# Patient Record
Sex: MALE | Race: BLACK OR AFRICAN AMERICAN | NOT HISPANIC OR LATINO | Employment: UNEMPLOYED | URBAN - METROPOLITAN AREA
[De-identification: names, ages, dates, MRNs, and addresses within clinical notes are randomized per-mention and may not be internally consistent; named-entity substitution may affect disease eponyms.]

---

## 2023-03-20 LAB — SYPHILIS TOTAL AB: NONREACTIVE

## 2023-03-21 LAB
CHLAMYDIA TRACH., AMPLIFIED: NEGATIVE
N. GONORRHEA, AMPLIFIED: NEGATIVE

## 2023-06-09 LAB
COMPLETE PATHOLOGY REPORT: NORMAL
CONVERTED CLINICAL DIAGNOSIS-HISTORY: NORMAL
CONVERTED DIAGNOSIS COMMENT: NORMAL
CONVERTED FINAL DIAGNOSIS: NORMAL
CONVERTED FINAL REPORT PDF LINK TO COPY AND PASTE: NORMAL
CONVERTED SPECIMEN DESCRIPTION: NORMAL

## 2023-07-19 ENCOUNTER — HOSPITAL ENCOUNTER (OUTPATIENT)
Dept: DATA CONVERSION | Facility: HOSPITAL | Age: 33
End: 2023-07-19
Attending: SURGERY | Admitting: SURGERY
Payer: COMMERCIAL

## 2023-07-19 DIAGNOSIS — K62.82 DYSPLASIA OF ANUS: ICD-10-CM

## 2023-07-27 LAB
COMPLETE PATHOLOGY REPORT: NORMAL
CONVERTED CLINICAL DIAGNOSIS-HISTORY: NORMAL
CONVERTED FINAL DIAGNOSIS: NORMAL
CONVERTED FINAL REPORT PDF LINK TO COPY AND PASTE: NORMAL
CONVERTED GROSS DESCRIPTION: NORMAL

## 2023-08-24 LAB
ALANINE AMINOTRANSFERASE (SGPT) (U/L) IN SER/PLAS: 11 U/L (ref 10–52)
ALBUMIN (G/DL) IN SER/PLAS: 4.6 G/DL (ref 3.4–5)
ALKALINE PHOSPHATASE (U/L) IN SER/PLAS: 53 U/L (ref 33–120)
ANION GAP IN SER/PLAS: 14 MMOL/L (ref 10–20)
ASPARTATE AMINOTRANSFERASE (SGOT) (U/L) IN SER/PLAS: 24 U/L (ref 9–39)
BASOPHILS (10*3/UL) IN BLOOD BY AUTOMATED COUNT: 0.04 X10E9/L (ref 0–0.1)
BASOPHILS/100 LEUKOCYTES IN BLOOD BY AUTOMATED COUNT: 0.4 % (ref 0–2)
BILIRUBIN TOTAL (MG/DL) IN SER/PLAS: 0.7 MG/DL (ref 0–1.2)
CALCIDIOL (25 OH VITAMIN D3) (NG/ML) IN SER/PLAS: 48 NG/ML
CALCIUM (MG/DL) IN SER/PLAS: 9.3 MG/DL (ref 8.6–10.6)
CARBON DIOXIDE, TOTAL (MMOL/L) IN SER/PLAS: 26 MMOL/L (ref 21–32)
CD3+CD4+ ABSOLUTE: 1.16 X10E9/L (ref 0.35–2.74)
CD3+CD8+ ABSOLUTE: 0.78 X10E9/L (ref 0.08–1.49)
CD4/CD8 RATIO: 1.48 (ref 1–3.5)
CD45%: 100 %
CHLAMYDIA TRACH., AMPLIFIED: NEGATIVE
CHLORIDE (MMOL/L) IN SER/PLAS: 107 MMOL/L (ref 98–107)
CHOLESTEROL (MG/DL) IN SER/PLAS: 121 MG/DL (ref 0–199)
CHOLESTEROL IN HDL (MG/DL) IN SER/PLAS: 49.3 MG/DL
CHOLESTEROL/HDL RATIO: 2.5
CP CD3+CD4+%: 46 % (ref 29–57)
CP CD3+CD8+%: 31 % (ref 7–31)
CREATININE (MG/DL) IN SER/PLAS: 1.13 MG/DL (ref 0.5–1.3)
EOSINOPHILS (10*3/UL) IN BLOOD BY AUTOMATED COUNT: 0.12 X10E9/L (ref 0–0.7)
EOSINOPHILS/100 LEUKOCYTES IN BLOOD BY AUTOMATED COUNT: 1.2 % (ref 0–6)
ERYTHROCYTE DISTRIBUTION WIDTH (RATIO) BY AUTOMATED COUNT: 13.4 % (ref 11.5–14.5)
ERYTHROCYTE MEAN CORPUSCULAR HEMOGLOBIN CONCENTRATION (G/DL) BY AUTOMATED: 35 G/DL (ref 32–36)
ERYTHROCYTE MEAN CORPUSCULAR VOLUME (FL) BY AUTOMATED COUNT: 92 FL (ref 80–100)
ERYTHROCYTES (10*6/UL) IN BLOOD BY AUTOMATED COUNT: 4.41 X10E12/L (ref 4.5–5.9)
FMETH: NORMAL
FSIT1: NORMAL
GFR MALE: 89 ML/MIN/1.73M2
GLUCOSE (MG/DL) IN SER/PLAS: 58 MG/DL (ref 74–99)
HEMATOCRIT (%) IN BLOOD BY AUTOMATED COUNT: 40.6 % (ref 41–52)
HEMOGLOBIN (G/DL) IN BLOOD: 14.2 G/DL (ref 13.5–17.5)
IMMATURE GRANULOCYTES/100 LEUKOCYTES IN BLOOD BY AUTOMATED COUNT: 0.3 % (ref 0–0.9)
LDL: 61 MG/DL (ref 0–99)
LEUKOCYTES (10*3/UL) IN BLOOD BY AUTOMATED COUNT: 10 X10E9/L (ref 4.4–11.3)
LYMPHOCYTES (10*3/UL) IN BLOOD BY AUTOMATED COUNT: 2.53 X10E9/L (ref 1.2–4.8)
LYMPHOCYTES/100 LEUKOCYTES IN BLOOD BY AUTOMATED COUNT: 25.2 % (ref 13–44)
MONOCYTES (10*3/UL) IN BLOOD BY AUTOMATED COUNT: 0.85 X10E9/L (ref 0.1–1)
MONOCYTES/100 LEUKOCYTES IN BLOOD BY AUTOMATED COUNT: 8.5 % (ref 2–10)
N. GONORRHEA, AMPLIFIED: NEGATIVE
NEUTROPHILS (10*3/UL) IN BLOOD BY AUTOMATED COUNT: 6.47 X10E9/L (ref 1.2–7.7)
NEUTROPHILS/100 LEUKOCYTES IN BLOOD BY AUTOMATED COUNT: 64.4 % (ref 40–80)
NRBC (PER 100 WBCS) BY AUTOMATED COUNT: 0 /100 WBC (ref 0–0)
PLATELETS (10*3/UL) IN BLOOD AUTOMATED COUNT: 119 X10E9/L (ref 150–450)
POTASSIUM (MMOL/L) IN SER/PLAS: 4.5 MMOL/L (ref 3.5–5.3)
PROTEIN TOTAL: 7.3 G/DL (ref 6.4–8.2)
RBC MORPHOLOGY IN BLOOD: NORMAL
SODIUM (MMOL/L) IN SER/PLAS: 142 MMOL/L (ref 136–145)
SYPHILIS TOTAL AB: NONREACTIVE
TRIGLYCERIDE (MG/DL) IN SER/PLAS: 54 MG/DL (ref 0–149)
UREA NITROGEN (MG/DL) IN SER/PLAS: 14 MG/DL (ref 6–23)
VLDL: 11 MG/DL (ref 0–40)

## 2023-08-25 LAB
HIV-1 RNA PCR VIRAL LOAD LOG: NORMAL LOG10 CPY/ML
HIV-1 RNA VIRAL LOAD: NOT DETECTED COPIES/ML

## 2023-09-29 VITALS
HEIGHT: 71 IN | SYSTOLIC BLOOD PRESSURE: 119 MMHG | BODY MASS INDEX: 20.22 KG/M2 | DIASTOLIC BLOOD PRESSURE: 82 MMHG | WEIGHT: 144.4 LBS | HEART RATE: 55 BPM | TEMPERATURE: 97.2 F | RESPIRATION RATE: 15 BRPM

## 2023-09-30 NOTE — H&P
History & Physical Reviewed:   I have reviewed the History and Physical dated:  07-Jul-2023   History and Physical reviewed and relevant findings noted. Patient examined to review pertinent physical  findings.: No significant changes   Home Medications Reviewed: no changes noted   Allergies Reviewed: no changes noted       ERAS (Enhanced Recovery After Surgery):  ·  ERAS Patient: no     Consent:   COVID-19 Consent:  ·  COVID-19 Risk Consent Surgeon has reviewed key risks related to the risk of tawanda COVID-19 and if they contract COVID-19 what the risks are.     Attestation:   Note Completion:  I am a:  Resident/Fellow   Attending Attestation I saw and evaluated the patient.  I personally obtained the key and critical portions of the history and physical exam or was physically present for key and  critical portions performed by the resident/fellow. I reviewed the resident/fellow?s documentation and discussed the patient with the resident/fellow.  I agree with the resident/fellow?s medical decision making as documented in the note.     I personally evaluated the patient on 19-Jul-2023         Electronic Signatures:  Isidra Brennan (Fellow))  (Signed 19-Jul-2023 08:03)   Authored: History & Physical Reviewed, ERAS, Consent,  Note Completion  Shameka Still)  (Signed 19-Jul-2023 08:18)   Authored: Note Completion   Co-Signer: History & Physical Reviewed, ERAS, Consent, Note Completion      Last Updated: 19-Jul-2023 08:18 by Shameka Still)

## 2023-10-02 NOTE — OP NOTE
PROCEDURE DETAILS    Preoperative Diagnosis:  anal dysplasia  Postoperative Diagnosis:  same  Surgeon: Shameka Still  Resident/Fellow/Other Assistant: Isidra Brennan    Procedure:  1.  high resolution anoscopy with biopsy    Anesthesia: Michelle Velez  Estimated Blood Loss: 0  Findings: anterior anal canal with area of possible dysplasia        Operative Report:   Procedure:   Informed consent was obtained including discussion of risks, benefits, and alternatives. The patient was brought to the operating room and placed supine. Sequential compression devices were placed. Huddle was completed in accordance with hospital procedures.  Anesthesia was induced and LMA was placed. The patient was placed in lithotomy with all pressure points padded. The area was prepped and draped in the usual fashion. Time out was completed. Digital rectal exam was performed. Pudendal nerve block was performed  with 1/4% marcaine with epinephrine. Intersphincteric block  was also performed. A total of 30cc of local anesthesia was utilized.    Externally, the perineum was normal. [place]-Garrett anoscope was introduced. The anal canal was examined sequentially. The canal itself was divided into octants and in each octant, the distal rectum, dentate, anal canal, and anal verge was examined.  Magnification, acetic acid, and Lugol's were utilized to improve visualization. Areas of abnormality were biopsied and destroyed. There was as single flat area of possible dysplasia anteriorly.     The area was checked for hemostasis and found to be adequate. All visible lesions were addressed.     The patient was then returned to supine. Anesthesia was weaned. The patient was transferred to PACU awake and in stable conditions. Counts were  reported correct at the end of the procedure. I was present and scrubbed throughout.                           Electronic Signatures:  Shameka Still)  (Signed 19-Jul-2023  08:41)   Authored: Post-Operative Note, Chart Review, Note Completion      Last Updated: 19-Jul-2023 08:41 by Shameka Still)

## 2023-12-07 ENCOUNTER — APPOINTMENT (OUTPATIENT)
Dept: UROLOGY | Facility: CLINIC | Age: 33
End: 2023-12-07

## 2023-12-28 ENCOUNTER — NUTRITION (OUTPATIENT)
Dept: IMMUNOLOGY | Facility: CLINIC | Age: 33
End: 2023-12-28

## 2023-12-28 ENCOUNTER — OFFICE VISIT (OUTPATIENT)
Dept: IMMUNOLOGY | Facility: CLINIC | Age: 33
End: 2023-12-28
Payer: COMMERCIAL

## 2023-12-28 VITALS
HEIGHT: 71 IN | HEART RATE: 98 BPM | DIASTOLIC BLOOD PRESSURE: 81 MMHG | TEMPERATURE: 97.5 F | BODY MASS INDEX: 19.82 KG/M2 | RESPIRATION RATE: 18 BRPM | WEIGHT: 141.6 LBS | OXYGEN SATURATION: 98 % | SYSTOLIC BLOOD PRESSURE: 116 MMHG

## 2023-12-28 DIAGNOSIS — B20 HIV INFECTION, UNSPECIFIED SYMPTOM STATUS (MULTI): ICD-10-CM

## 2023-12-28 DIAGNOSIS — R85.610 PAP SMEAR OF ANUS WITH ASCUS: Primary | ICD-10-CM

## 2023-12-28 DIAGNOSIS — E55.9 VITAMIN D DEFICIENCY: ICD-10-CM

## 2023-12-28 DIAGNOSIS — Z23 FLU VACCINE NEED: ICD-10-CM

## 2023-12-28 DIAGNOSIS — B20 HIV INFECTION, UNSPECIFIED SYMPTOM STATUS (MULTI): Primary | ICD-10-CM

## 2023-12-28 PROBLEM — Z21 ASYMPTOMATIC HIV INFECTION, WITH NO HISTORY OF HIV-RELATED ILLNESS (MULTI): Status: ACTIVE | Noted: 2023-12-28

## 2023-12-28 PROBLEM — E66.811 CLASS 1 OBESITY DUE TO EXCESS CALORIES WITHOUT SERIOUS COMORBIDITY IN ADULT: Status: RESOLVED | Noted: 2023-12-28 | Resolved: 2023-12-28

## 2023-12-28 PROBLEM — I10 PRIMARY HYPERTENSION: Status: RESOLVED | Noted: 2023-12-28 | Resolved: 2023-12-28

## 2023-12-28 PROBLEM — E66.09 CLASS 1 OBESITY DUE TO EXCESS CALORIES WITHOUT SERIOUS COMORBIDITY IN ADULT: Status: RESOLVED | Noted: 2023-12-28 | Resolved: 2023-12-28

## 2023-12-28 PROBLEM — E66.811 CLASS 1 OBESITY DUE TO EXCESS CALORIES WITHOUT SERIOUS COMORBIDITY IN ADULT: Status: ACTIVE | Noted: 2023-12-28

## 2023-12-28 PROBLEM — E66.09 CLASS 1 OBESITY DUE TO EXCESS CALORIES WITHOUT SERIOUS COMORBIDITY IN ADULT: Status: ACTIVE | Noted: 2023-12-28

## 2023-12-28 PROBLEM — I10 PRIMARY HYPERTENSION: Status: ACTIVE | Noted: 2023-12-28

## 2023-12-28 LAB
25(OH)D3 SERPL-MCNC: 30 NG/ML (ref 30–100)
ALBUMIN SERPL BCP-MCNC: 4.5 G/DL (ref 3.4–5)
ALP SERPL-CCNC: 57 U/L (ref 33–120)
ALT SERPL W P-5'-P-CCNC: 22 U/L (ref 10–52)
ANION GAP SERPL CALC-SCNC: 13 MMOL/L (ref 10–20)
AST SERPL W P-5'-P-CCNC: 27 U/L (ref 9–39)
BASOPHILS # BLD AUTO: 0.05 X10*3/UL (ref 0–0.1)
BASOPHILS NFR BLD AUTO: 0.6 %
BILIRUB SERPL-MCNC: 0.8 MG/DL (ref 0–1.2)
BUN SERPL-MCNC: 13 MG/DL (ref 6–23)
CALCIUM SERPL-MCNC: 9.5 MG/DL (ref 8.6–10.6)
CHLORIDE SERPL-SCNC: 104 MMOL/L (ref 98–107)
CO2 SERPL-SCNC: 28 MMOL/L (ref 21–32)
CREAT SERPL-MCNC: 1.2 MG/DL (ref 0.5–1.3)
EOSINOPHIL # BLD AUTO: 0.21 X10*3/UL (ref 0–0.7)
EOSINOPHIL NFR BLD AUTO: 2.5 %
ERYTHROCYTE [DISTWIDTH] IN BLOOD BY AUTOMATED COUNT: 12.8 % (ref 11.5–14.5)
GFR SERPL CREATININE-BSD FRML MDRD: 83 ML/MIN/1.73M*2
GLUCOSE SERPL-MCNC: 79 MG/DL (ref 74–99)
HCT VFR BLD AUTO: 42.9 % (ref 41–52)
HGB BLD-MCNC: 15.1 G/DL (ref 13.5–17.5)
IMM GRANULOCYTES # BLD AUTO: 0.02 X10*3/UL (ref 0–0.7)
IMM GRANULOCYTES NFR BLD AUTO: 0.2 % (ref 0–0.9)
LYMPHOCYTES # BLD AUTO: 3.25 X10*3/UL (ref 1.2–4.8)
LYMPHOCYTES NFR BLD AUTO: 38.9 %
MCH RBC QN AUTO: 32.1 PG (ref 26–34)
MCHC RBC AUTO-ENTMCNC: 35.2 G/DL (ref 32–36)
MCV RBC AUTO: 91 FL (ref 80–100)
MONOCYTES # BLD AUTO: 0.57 X10*3/UL (ref 0.1–1)
MONOCYTES NFR BLD AUTO: 6.8 %
NEUTROPHILS # BLD AUTO: 4.25 X10*3/UL (ref 1.2–7.7)
NEUTROPHILS NFR BLD AUTO: 51 %
NRBC BLD-RTO: 0 /100 WBCS (ref 0–0)
PLATELET # BLD AUTO: 148 X10*3/UL (ref 150–450)
POTASSIUM SERPL-SCNC: 4 MMOL/L (ref 3.5–5.3)
PROT SERPL-MCNC: 7.4 G/DL (ref 6.4–8.2)
RBC # BLD AUTO: 4.7 X10*6/UL (ref 4.5–5.9)
SODIUM SERPL-SCNC: 141 MMOL/L (ref 136–145)
WBC # BLD AUTO: 8.4 X10*3/UL (ref 4.4–11.3)

## 2023-12-28 PROCEDURE — 90686 IIV4 VACC NO PRSV 0.5 ML IM: CPT | Performed by: INTERNAL MEDICINE

## 2023-12-28 PROCEDURE — 36415 COLL VENOUS BLD VENIPUNCTURE: CPT | Performed by: INTERNAL MEDICINE

## 2023-12-28 PROCEDURE — 87536 HIV-1 QUANT&REVRSE TRNSCRPJ: CPT | Performed by: INTERNAL MEDICINE

## 2023-12-28 PROCEDURE — 99214 OFFICE O/P EST MOD 30 MIN: CPT | Performed by: INTERNAL MEDICINE

## 2023-12-28 PROCEDURE — 82306 VITAMIN D 25 HYDROXY: CPT | Performed by: INTERNAL MEDICINE

## 2023-12-28 PROCEDURE — 99214 OFFICE O/P EST MOD 30 MIN: CPT | Mod: 25 | Performed by: INTERNAL MEDICINE

## 2023-12-28 PROCEDURE — 4274F FLU IMMUNO ADMIND RCVD: CPT | Performed by: INTERNAL MEDICINE

## 2023-12-28 PROCEDURE — 85025 COMPLETE CBC W/AUTO DIFF WBC: CPT | Performed by: INTERNAL MEDICINE

## 2023-12-28 PROCEDURE — 80053 COMPREHEN METABOLIC PANEL: CPT | Performed by: INTERNAL MEDICINE

## 2023-12-28 PROCEDURE — 88185 FLOWCYTOMETRY/TC ADD-ON: CPT | Mod: TC | Performed by: INTERNAL MEDICINE

## 2023-12-28 RX ORDER — BICTEGRAVIR SODIUM, EMTRICITABINE, AND TENOFOVIR ALAFENAMIDE FUMARATE 50; 200; 25 MG/1; MG/1; MG/1
1 TABLET ORAL DAILY
COMMUNITY
End: 2023-12-28 | Stop reason: SDUPTHER

## 2023-12-28 RX ORDER — BICTEGRAVIR SODIUM, EMTRICITABINE, AND TENOFOVIR ALAFENAMIDE FUMARATE 50; 200; 25 MG/1; MG/1; MG/1
1 TABLET ORAL DAILY
Qty: 30 TABLET | Refills: 5 | Status: SHIPPED | OUTPATIENT
Start: 2023-12-28 | End: 2024-05-10 | Stop reason: SDUPTHER

## 2023-12-28 RX ORDER — LORATADINE 10 MG/1
10 TABLET ORAL
COMMUNITY

## 2023-12-28 RX ORDER — CHOLECALCIFEROL (VITAMIN D3) 50 MCG
1 TABLET ORAL DAILY
COMMUNITY

## 2023-12-28 ASSESSMENT — ENCOUNTER SYMPTOMS
DIAPHORESIS: 0
FATIGUE: 0
ARTHRALGIAS: 0
HEMATOLOGIC/LYMPHATIC NEGATIVE: 1
FLANK PAIN: 0
DIZZINESS: 0
CHILLS: 0
NECK STIFFNESS: 0
COUGH: 0
EYE PAIN: 0
EYE DISCHARGE: 0
FEVER: 0
APPETITE CHANGE: 0
ABDOMINAL DISTENTION: 0
HYPERACTIVE: 0
NUMBNESS: 0
DIARRHEA: 0
CARDIOVASCULAR NEGATIVE: 1
ALLERGIC/IMMUNOLOGIC NEGATIVE: 1
EYE REDNESS: 0
LIGHT-HEADEDNESS: 0
ADENOPATHY: 0
RECTAL PAIN: 0
BRUISES/BLEEDS EASILY: 0
DYSURIA: 0
EYE ITCHING: 0
SLEEP DISTURBANCE: 0
MYALGIAS: 0
SINUS PRESSURE: 0
BACK PAIN: 0
TREMORS: 0
CONSTITUTIONAL NEGATIVE: 1
NEUROLOGICAL NEGATIVE: 1
COLOR CHANGE: 0
PSYCHIATRIC NEGATIVE: 1
CONFUSION: 0
HEMATURIA: 0
HEADACHES: 0
PHOTOPHOBIA: 0
JOINT SWELLING: 0
SINUS PAIN: 0
MUSCULOSKELETAL NEGATIVE: 1
NAUSEA: 0
DYSPHORIC MOOD: 0
NECK PAIN: 0
EYES NEGATIVE: 1
DECREASED CONCENTRATION: 0
CONSTIPATION: 0
ENDOCRINE NEGATIVE: 1
WHEEZING: 0
WOUND: 0
FREQUENCY: 0
ABDOMINAL PAIN: 0
SEIZURES: 0
BLOOD IN STOOL: 0
SHORTNESS OF BREATH: 0
RHINORRHEA: 0
HALLUCINATIONS: 0
SORE THROAT: 0
ANAL BLEEDING: 0
NERVOUS/ANXIOUS: 0
VOMITING: 0
DIFFICULTY URINATING: 0
SPEECH DIFFICULTY: 0
GASTROINTESTINAL NEGATIVE: 1

## 2023-12-28 ASSESSMENT — PAIN SCALES - GENERAL: PAINLEVEL: 0-NO PAIN

## 2023-12-28 NOTE — PROGRESS NOTES
HIV Clinic Visit:   Odilia Villegas is a 33 y.o. male was last seen in [facility] on today.    Missed antiretroviral doses in last 72 hours? No    Sexually active? Yes Partner/s aware of diagnosis? Yes    Condom use? Yes Partner on PrEP? No    Tobacco use: Yes. He smokes cigars.    SUBJECTIVE: Mr Villegas states that he is doing well. He is here with his partner who is also HIV positive and on treatment. Mr Villegas denies any complains. He has not missed any doses of his medications. He works as an . He agreed to receive an influenza vaccine.         Review of Systems   Constitutional: Negative.  Negative for appetite change, chills, diaphoresis, fatigue and fever.   HENT: Negative.  Negative for congestion, dental problem, ear discharge, ear pain, hearing loss, mouth sores, nosebleeds, rhinorrhea, sinus pressure, sinus pain, sneezing, sore throat and tinnitus.    Eyes: Negative.  Negative for photophobia, pain, discharge, redness, itching and visual disturbance.   Respiratory:  Negative for cough, shortness of breath and wheezing.    Cardiovascular: Negative.  Negative for chest pain.   Gastrointestinal: Negative.  Negative for abdominal distention, abdominal pain, anal bleeding, blood in stool, constipation, diarrhea, nausea, rectal pain and vomiting.   Endocrine: Negative.    Genitourinary: Negative.  Negative for difficulty urinating, dysuria, enuresis, flank pain, frequency, genital sores, hematuria and urgency.   Musculoskeletal: Negative.  Negative for arthralgias, back pain, gait problem, joint swelling, myalgias, neck pain and neck stiffness.   Skin: Negative.  Negative for color change, pallor, rash and wound.   Allergic/Immunologic: Negative.    Neurological: Negative.  Negative for dizziness, tremors, seizures, syncope, speech difficulty, light-headedness, numbness and headaches.   Hematological: Negative.  Negative for adenopathy. Does not bruise/bleed easily.   Psychiatric/Behavioral: Negative.   "Negative for behavioral problems, confusion, decreased concentration, dysphoric mood, hallucinations, self-injury and sleep disturbance. The patient is not nervous/anxious and is not hyperactive.          Objective   Visit Vitals  /81 (BP Location: Left arm, Patient Position: Sitting, BP Cuff Size: Adult)   Pulse 98   Temp 36.4 °C (97.5 °F) (Temporal)   Resp 18   Ht 1.803 m (5' 11\")   Wt 64.2 kg (141 lb 9.6 oz)   SpO2 98%   BMI 19.75 kg/m²   Smoking Status Every Day   BSA 1.79 m²        Physical Exam  Constitutional:       Appearance: Normal appearance.   HENT:      Head: Normocephalic and atraumatic.      Right Ear: Tympanic membrane normal.      Left Ear: Tympanic membrane normal.      Nose: Nose normal.      Mouth/Throat:      Mouth: Mucous membranes are moist.      Tongue: No lesions.      Palate: No mass.      Pharynx: Oropharynx is clear. No pharyngeal swelling, oropharyngeal exudate, posterior oropharyngeal erythema or uvula swelling.      Tonsils: No tonsillar exudate.   Eyes:      General: Lids are normal.      Extraocular Movements: Extraocular movements intact.      Conjunctiva/sclera: Conjunctivae normal.      Pupils: Pupils are equal, round, and reactive to light.   Neck:      Thyroid: No thyroid mass or thyroid tenderness.      Vascular: Normal carotid pulses. No carotid bruit or JVD.      Trachea: Trachea normal.   Cardiovascular:      Rate and Rhythm: Normal rate and regular rhythm.      Pulses: Normal pulses.      Heart sounds: Normal heart sounds.   Pulmonary:      Effort: Pulmonary effort is normal.      Breath sounds: Normal breath sounds.   Abdominal:      General: Abdomen is flat. Bowel sounds are normal.      Palpations: Abdomen is soft. There is no hepatomegaly, splenomegaly or mass.      Tenderness: There is no abdominal tenderness.   Musculoskeletal:         General: No swelling, tenderness, deformity or signs of injury. Normal range of motion.      Cervical back: Full passive range " "of motion without pain, normal range of motion and neck supple.      Right lower leg: No edema.      Left lower leg: No edema.   Lymphadenopathy:      Cervical: No cervical adenopathy.   Skin:     General: Skin is warm and dry.   Neurological:      General: No focal deficit present.      Mental Status: He is alert and oriented to person, place, and time.   Psychiatric:         Mood and Affect: Mood normal.         CURRENT MEDICATIONS    Current Outpatient Medications:     Biktarvy -25 mg tablet, Take 1 tablet by mouth once daily., Disp: , Rfl:     cholecalciferol (Vitamin D-3) 50 MCG (2000 UT) tablet, Take 1 tablet (2,000 Units) by mouth once daily., Disp: , Rfl:     loratadine (Claritin) 10 mg tablet, Take 1 tablet (10 mg) by mouth once daily., Disp: , Rfl:        Relevant Results  Labs  Lab Results   Component Value Date    LZL7RLTAY NOT DETECTED 08/24/2023    VX7QOT5GCWN 0.784 08/24/2023    VITD25 48 08/24/2023      Lab Results   Component Value Date    WBC 10.0 08/24/2023    HGB 14.2 08/24/2023    HCT 40.6 (L) 08/24/2023    MCV 92 08/24/2023     (L) 08/24/2023      Lab Results   Component Value Date    GLUCOSE 58 (L) 08/24/2023    CALCIUM 9.3 08/24/2023     08/24/2023    K 4.5 08/24/2023    CO2 26 08/24/2023     08/24/2023    BUN 14 08/24/2023    CREATININE 1.13 08/24/2023      Lab Results   Component Value Date    CHOL 121 08/24/2023    CHOL 111 11/04/2021    CHOL 118 10/08/2020     Lab Results   Component Value Date    HDL 49.3 08/24/2023    HDL 47.0 11/04/2021    HDL 53.1 10/08/2020     No results found for: \"LDLCALC\"  Lab Results   Component Value Date    TRIG 54 08/24/2023    TRIG 54 11/04/2021    TRIG 26 (LL) 10/08/2020     No components found for: \"CHOLHDL\"       Assessment/Plan   Problem List Items Addressed This Visit       RESOLVED: Primary hypertension - Primary    Vitamin D deficiency    Overview     Mr Villegas is an chronic Vitamin D supplementation. Vit D level has been " adequate.         Relevant Orders    Vitamin D 25-Hydroxy,Total (for eval of Vitamin D levels)     Other Visit Diagnoses       Flu vaccine need        Relevant Orders    Flu vaccine (IIV4) age 6 months and greater, preservative free (Completed)    HIV infection, unspecified symptom status (CMS/HCC)        Relevant Orders    CBC and Auto Differential    CD4/8 Panel    Comprehensive Metabolic Panel    HIV RNA, quantitative, PCR               Conner Montemayor MD

## 2023-12-28 NOTE — PROGRESS NOTES
"Nutrition Assessment     Reason for Visit:  Odilia Villegas is a 33 y.o. male who was seen today for follow-up.     Anthropometrics:  Height: 5'11\"  Weight: 64.2 kg   BMI: 19.8      Food And Nutrient Intake:  Food and Nutrient History  Food and Nutrient History: Pt is progressing well at this time. When asked about participating in the [employer] market, he explained that he went once but no longer has food insecurity as he is in a better position at this time. He reported that he eats a variety of poultry, fish, ocassionally beef, misc. fruit and vegetables, grains, nuts and seeds. He had questions related to MVI, however MD discouraged pt from taking MVI containing minerals 2/2 potential interaction with prescribed ART (per pt). Considering pt's report of comprehensive intake and no known hx of micronutrient deficiency or provider recommendation that he take an MVI, this RD recommended that he forgo MVI at this time.     Nutrition Diagnosis      Nutrition Diagnosis  Diagnosis Status (1): Resolved  Nutrition Diagnosis 1: Limited access to food  Related to (1): socioeconomic limitations  As Evidenced by (1): pt interview, pt responding \"Yes\" to one or both food insecurity questions on [facility] intake questionnaire  Additional Assessment Information (1): Pt reported today that he no longer has food insecurity.    Nutrition Interventions/Recommendations   Food and Nutrition Delivery  Goals: Pt was encouraged to contact this RD or the LEI should he experience food insecurity again in the future.    Nutrition Monitoring and Evaluation   Food/Nutrient Related History Monitoring  Additional Plan: This RD will follow-up with pt at next visit to ensure stable food access.    Follow-up  Progress will be reassessed at next office visit. The estimated number of remaining follow-ups at this time: 1+ at a frequency of ~Q6 months. The total number and frequency of remaining follow-ups may change depending on patient's progress. "     Time spent with patient: 15 minutes of which greater than 50 percent was spent counseling and or coordinating care.

## 2023-12-28 NOTE — LETTER
12/28/23    Anabelle Ho MD  [id number] [facility] [address]      Dear Dr. Anabelle Ho MD,    I am writing to confirm that your patient, Odilia Villegas, received care in my office on 12/28/23. I have enclosed a summary of the care provided to Odilia for your reference.    Please contact me with any questions you may have regarding the visit.    Sincerely,         Conner Montemayor MD  [address]  [facility] [address]  [phone]    CC: No Recipients

## 2023-12-29 LAB
CD3+CD4+ CELLS # BLD: 1.62 X10E9/L
CD3+CD4+ CELLS # BLD: 1625 /MM3
CD3+CD4+ CELLS NFR BLD: 50 %
CD3+CD4+ CELLS/CD3+CD8+ CLL BLD: 1.47 %
CD3+CD8+ CELLS # BLD: 1.1 X10E9/L
CD3+CD8+ CELLS NFR BLD: 34 %
HIV1 RNA # PLAS NAA DL=20: 22 COPIES/ML
HIV1 RNA # PLAS NAA DL=20: DETECTED {COPIES}/ML
HIV1 RNA SPEC NAA+PROBE-LOG#: 1.34 LOG10 CPY/ML
LYMPHOCYTES # SPEC AUTO: 3.25 X10*3/UL

## 2024-01-15 NOTE — PROGRESS NOTES
No chief complaint on file.      History Of Present Illness    Subjective   Odilia Villegas is here for follow up of anal dysplasia.  he  has not has not noticed any new lesions or changes.     HIV status: positive- CD4 WNL and VL 22 copies   Smoking status: current smoker    Previous High Resolution Anoscopies and Cytologies have shown:     6/2015 PAP: Negative  6/2016 PAP: ASCUS  10/2016 HRA: Condyloma with moderate dysplasia  6/2023 PAP: LSIL  7/2023 HRA: squamous mucosa with mild superficial neutrophilic infiltrate       Past Medical History  He  Past Medical History:   Diagnosis Date    Encounter for screening for respiratory tuberculosis 05/23/2019    Encounter for PPD test    Other conditions influencing health status     No significant past medical history       Surgical History  He  Past Surgical History:   Procedure Laterality Date    OTHER SURGICAL HISTORY  04/26/2017    Anal Surgery        Social History  He reports that he has been smoking cigars. He has never used smokeless tobacco. He reports current alcohol use. He reports current drug use. Drug: Marijuana.    Family History  No family history on file.     Allergies  Shellfish containing products    Home Medications  Prior to Admission medications    Medication Sig Start Date End Date Taking? Authorizing Provider   Biktarvy -25 mg tablet Take 1 tablet by mouth once daily. 12/28/23   Conner Montemayor MD   cholecalciferol (Vitamin D-3) 50 MCG (2000 UT) tablet Take 1 tablet (2,000 Units) by mouth once daily.    Historical Provider, MD   loratadine (Claritin) 10 mg tablet Take 1 tablet (10 mg) by mouth once daily.    Historical Provider, MD       Review of Systems     Physical Exam    Perineal/SAVANA:  Perineum: normal  SAVANA: normal  Tone: normal    Anoscopy    Date/Time: 1/18/2024 10:52 AM    Performed by: Shameka Still MD  Authorized by: Shameka Still MD    Consent:     Consent obtained:  Verbal    Consent given by:   Patient  Post-procedure details:     Procedure completion:  Tolerated well, no immediate complications  Comments:      Findings: no dysplasia      Last Recorded Vitals  There were no vitals taken for this visit.      Assessment and Plan  34 y.o. male here for follow up of anal dysplasia.     Normal exam. Follow up 6 months with cytology.     I emphasized the significant possibility of recurrence and the need for close follow-up. We discussed barrier protection when sexually active, non-smoking, and taking HIV medications.

## 2024-01-18 ENCOUNTER — OFFICE VISIT (OUTPATIENT)
Dept: SURGERY | Facility: CLINIC | Age: 34
End: 2024-01-18
Payer: COMMERCIAL

## 2024-01-18 VITALS
OXYGEN SATURATION: 97 % | HEART RATE: 94 BPM | WEIGHT: 146 LBS | TEMPERATURE: 97.8 F | DIASTOLIC BLOOD PRESSURE: 62 MMHG | BODY MASS INDEX: 20.44 KG/M2 | HEIGHT: 71 IN | SYSTOLIC BLOOD PRESSURE: 104 MMHG

## 2024-01-18 DIAGNOSIS — R85.610 PAP SMEAR OF ANUS WITH ASCUS: Primary | ICD-10-CM

## 2024-01-18 PROCEDURE — 99214 OFFICE O/P EST MOD 30 MIN: CPT | Performed by: SURGERY

## 2024-01-18 PROCEDURE — 46600 DIAGNOSTIC ANOSCOPY SPX: CPT | Performed by: SURGERY

## 2024-01-18 ASSESSMENT — PAIN SCALES - GENERAL: PAINLEVEL: 0-NO PAIN

## 2024-05-09 ENCOUNTER — TELEPHONE (OUTPATIENT)
Dept: IMMUNOLOGY | Facility: CLINIC | Age: 34
End: 2024-05-09

## 2024-05-09 NOTE — TELEPHONE ENCOUNTER
"Pt leaves  stating he's having issues with his insurance and that he can't get his ART. Sw calls Pt to follow up. Pt reports that his coverage  because he didn't renew it - usually auto renewed. Pt also states that he has EOI but that it \"doesn't cover the meds at all.\" Pt confirms that he's working, paid weekly, agreeable to sending pay stubs. Sw confirms Pt's email and sends a message for him to respond with his four most recent pay stubs. Sw will review to determine if Pt eligible for ADAP or [place] PAP.     Later - Pt sends pay stubs. Sw calls [facility] to follow up on Rx/insurance issue. Per pharmacist Pt has not filled since February - insurance on file does work, however Pt has significant copay of >$4000. Pt has copay card on file that is exhausted - PAF would only cover one fill. Sw speaks with Pt again, advises that he is eligible for ADAP but would need to use [facility]. Pt agreeable to this - [facility]. Sw will advise RN of same. Pt sends copy of his insurance card and gives permission for Sw to sign necessary ADAP forms. Sw will complete enrollment and keep Pt updated. Pt rescheduled to see Dr. Montemayor on . Sw will plan to meet Pt at that appt.   "

## 2024-05-10 DIAGNOSIS — B20 HIV INFECTION, UNSPECIFIED SYMPTOM STATUS (MULTI): ICD-10-CM

## 2024-05-10 RX ORDER — BICTEGRAVIR SODIUM, EMTRICITABINE, AND TENOFOVIR ALAFENAMIDE FUMARATE 50; 200; 25 MG/1; MG/1; MG/1
1 TABLET ORAL DAILY
Qty: 30 TABLET | Refills: 2 | Status: SHIPPED | OUTPATIENT
Start: 2024-05-10

## 2024-06-13 ENCOUNTER — APPOINTMENT (OUTPATIENT)
Dept: SURGERY | Facility: CLINIC | Age: 34
End: 2024-06-13
Payer: COMMERCIAL

## 2024-08-08 ENCOUNTER — TELEPHONE (OUTPATIENT)
Dept: IMMUNOLOGY | Facility: CLINIC | Age: 34
End: 2024-08-08

## 2024-08-08 NOTE — TELEPHONE ENCOUNTER
"RN advises that Pt left VM that he is having issues with $4000 copay again. Dori calls [facility] to speak to pharmacy. Pharmacist states that they do not have ADAP information on file. Sw provides same. Per pharmacist they are receiving a \"bill other provider\" error when trying to run ADAP as secondary coverage. Dori sends message to Pt's [facility] coordinator to request follow up with pharmacy, provides contact number. Dori calls Pt to update him - will call him back once he can obtain Rx. Pt states understanding of same - he is currently out of Rx.  "

## 2024-08-15 ENCOUNTER — TELEPHONE (OUTPATIENT)
Dept: IMMUNOLOGY | Facility: CLINIC | Age: 34
End: 2024-08-15

## 2024-08-15 NOTE — TELEPHONE ENCOUNTER
Dori calls Pt to update him on pharmacy situation with ADAP. Dori messaged Pt's OHDAP coordinator about the issue again today. Dori leaves  for Pt, will follow with him with updates ASAP.

## 2024-08-19 ENCOUNTER — TELEPHONE (OUTPATIENT)
Dept: IMMUNOLOGY | Facility: CLINIC | Age: 34
End: 2024-08-19

## 2024-08-19 NOTE — TELEPHONE ENCOUNTER
"PharmD advises that they called Pt's Brian plan to provide PA for Biktarvy and was advised that Pt's plan excludes HIV medications. Dori advises Pt's [facility] coordinator of same. OD staff requesting EOB that verifies information. Dori calls Pt who is able to assist Sw with login to [facility]. Sw able to \"check cost\" of Biktarvy to see message that it is not covered. Dori attempts to find pharmacy EOB and gets message that pharmacy digital EOB are unavailable online or on manju. Dori uploads screenshots from [facility]'s website to [facility].     Later: Pt's ADAP enrollment switched to formulary, Pt should be able to access ART tomorrow, 8/20. Dori advises PharmD of same, emails Pt to update him as well.       "

## 2024-08-29 ENCOUNTER — CLINICAL SUPPORT (OUTPATIENT)
Dept: IMMUNOLOGY | Facility: CLINIC | Age: 34
End: 2024-08-29
Payer: COMMERCIAL

## 2024-08-29 DIAGNOSIS — E55.9 VITAMIN D DEFICIENCY: ICD-10-CM

## 2024-08-29 DIAGNOSIS — B20 HIV INFECTION, UNSPECIFIED SYMPTOM STATUS (MULTI): ICD-10-CM

## 2024-08-29 LAB
25(OH)D3 SERPL-MCNC: 44 NG/ML (ref 30–100)
ALBUMIN SERPL BCP-MCNC: 5 G/DL (ref 3.4–5)
ALP SERPL-CCNC: 56 U/L (ref 33–120)
ALT SERPL W P-5'-P-CCNC: 17 U/L (ref 10–52)
ANION GAP SERPL CALC-SCNC: 17 MMOL/L (ref 10–20)
AST SERPL W P-5'-P-CCNC: 23 U/L (ref 9–39)
BASOPHILS # BLD AUTO: 0.03 X10*3/UL (ref 0–0.1)
BASOPHILS NFR BLD AUTO: 0.4 %
BILIRUB SERPL-MCNC: 1 MG/DL (ref 0–1.2)
BUN SERPL-MCNC: 14 MG/DL (ref 6–23)
CALCIUM SERPL-MCNC: 9.9 MG/DL (ref 8.6–10.6)
CHLORIDE SERPL-SCNC: 101 MMOL/L (ref 98–107)
CO2 SERPL-SCNC: 26 MMOL/L (ref 21–32)
CREAT SERPL-MCNC: 1.2 MG/DL (ref 0.5–1.3)
EGFRCR SERPLBLD CKD-EPI 2021: 82 ML/MIN/1.73M*2
EOSINOPHIL # BLD AUTO: 0.14 X10*3/UL (ref 0–0.7)
EOSINOPHIL NFR BLD AUTO: 1.7 %
ERYTHROCYTE [DISTWIDTH] IN BLOOD BY AUTOMATED COUNT: 13 % (ref 11.5–14.5)
GLUCOSE SERPL-MCNC: 60 MG/DL (ref 74–99)
HCT VFR BLD AUTO: 44.5 % (ref 41–52)
HGB BLD-MCNC: 15.4 G/DL (ref 13.5–17.5)
IMM GRANULOCYTES # BLD AUTO: 0.03 X10*3/UL (ref 0–0.7)
IMM GRANULOCYTES NFR BLD AUTO: 0.4 % (ref 0–0.9)
LYMPHOCYTES # BLD AUTO: 3.5 X10*3/UL (ref 1.2–4.8)
LYMPHOCYTES NFR BLD AUTO: 42 %
MCH RBC QN AUTO: 32.3 PG (ref 26–34)
MCHC RBC AUTO-ENTMCNC: 34.6 G/DL (ref 32–36)
MCV RBC AUTO: 93 FL (ref 80–100)
MONOCYTES # BLD AUTO: 0.64 X10*3/UL (ref 0.1–1)
MONOCYTES NFR BLD AUTO: 7.7 %
NEUTROPHILS # BLD AUTO: 3.99 X10*3/UL (ref 1.2–7.7)
NEUTROPHILS NFR BLD AUTO: 47.8 %
NRBC BLD-RTO: 0 /100 WBCS (ref 0–0)
PLATELET # BLD AUTO: 149 X10*3/UL (ref 150–450)
POTASSIUM SERPL-SCNC: 4.6 MMOL/L (ref 3.5–5.3)
PROT SERPL-MCNC: 7.8 G/DL (ref 6.4–8.2)
RBC # BLD AUTO: 4.77 X10*6/UL (ref 4.5–5.9)
SODIUM SERPL-SCNC: 139 MMOL/L (ref 136–145)
TREPONEMA PALLIDUM IGG+IGM AB [PRESENCE] IN SERUM OR PLASMA BY IMMUNOASSAY: NONREACTIVE
WBC # BLD AUTO: 8.3 X10*3/UL (ref 4.4–11.3)

## 2024-08-29 PROCEDURE — 88185 FLOWCYTOMETRY/TC ADD-ON: CPT

## 2024-08-29 PROCEDURE — 86780 TREPONEMA PALLIDUM: CPT

## 2024-08-29 PROCEDURE — 82306 VITAMIN D 25 HYDROXY: CPT

## 2024-08-29 PROCEDURE — 87491 CHLMYD TRACH DNA AMP PROBE: CPT

## 2024-08-29 PROCEDURE — 85025 COMPLETE CBC W/AUTO DIFF WBC: CPT

## 2024-08-29 PROCEDURE — 87536 HIV-1 QUANT&REVRSE TRNSCRPJ: CPT

## 2024-08-29 PROCEDURE — 80053 COMPREHEN METABOLIC PANEL: CPT

## 2024-08-29 PROCEDURE — 36415 COLL VENOUS BLD VENIPUNCTURE: CPT

## 2024-08-30 LAB
C TRACH RRNA SPEC QL NAA+PROBE: NEGATIVE
CD3+CD4+ CELLS # BLD: 1.44 X10E9/L
CD3+CD4+ CELLS # BLD: 1435 /MM3
CD3+CD4+ CELLS NFR BLD: 41 %
CD3+CD4+ CELLS/CD3+CD8+ CLL BLD: 1.24 %
CD3+CD8+ CELLS # BLD: 1.16 X10E9/L
CD3+CD8+ CELLS NFR BLD: 33 %
HIV1 RNA # PLAS NAA DL=20: NOT DETECTED {COPIES}/ML
HIV1 RNA SPEC NAA+PROBE-LOG#: NORMAL {LOG_COPIES}/ML
LYMPHOCYTES # SPEC AUTO: 3.5 X10*3/UL
N GONORRHOEA DNA SPEC QL PROBE+SIG AMP: NEGATIVE

## 2024-10-08 ENCOUNTER — TELEPHONE (OUTPATIENT)
Dept: IMMUNOLOGY | Facility: CLINIC | Age: 34
End: 2024-10-08
Payer: COMMERCIAL

## 2024-10-08 NOTE — TELEPHONE ENCOUNTER
Sw calls to check in with Pt re: upcoming ADAP renewal due 10/31. Pt is scheduled to see Dr. Montemayor this Thursday, 10/10, will sign necessary forms at [facility]. Pt asks that Sw email him and he will respond with his four most recent pay stubs. Sw sends email at his request. Sw will follow, will complete ADAP updates once all documents received.

## 2024-10-10 ENCOUNTER — NUTRITION (OUTPATIENT)
Dept: IMMUNOLOGY | Facility: CLINIC | Age: 34
End: 2024-10-10

## 2024-10-10 ENCOUNTER — OFFICE VISIT (OUTPATIENT)
Dept: IMMUNOLOGY | Facility: CLINIC | Age: 34
End: 2024-10-10
Payer: COMMERCIAL

## 2024-10-10 VITALS
SYSTOLIC BLOOD PRESSURE: 106 MMHG | WEIGHT: 145.2 LBS | DIASTOLIC BLOOD PRESSURE: 65 MMHG | RESPIRATION RATE: 16 BRPM | HEART RATE: 59 BPM | BODY MASS INDEX: 20.25 KG/M2 | OXYGEN SATURATION: 100 %

## 2024-10-10 DIAGNOSIS — Z23 NEED FOR VACCINATION: ICD-10-CM

## 2024-10-10 DIAGNOSIS — Z21 HIV INFECTION, UNSPECIFIED SYMPTOM STATUS: ICD-10-CM

## 2024-10-10 DIAGNOSIS — K62.82 DYSPLASIA OF ANUS: ICD-10-CM

## 2024-10-10 DIAGNOSIS — R85.610 PAP SMEAR OF ANUS WITH ASCUS: ICD-10-CM

## 2024-10-10 DIAGNOSIS — J30.2 SEASONAL ALLERGIES: ICD-10-CM

## 2024-10-10 DIAGNOSIS — J30.2 SEASONAL ALLERGIC RHINITIS, UNSPECIFIED TRIGGER: ICD-10-CM

## 2024-10-10 DIAGNOSIS — Z21 ASYMPTOMATIC HIV INFECTION, WITH NO HISTORY OF HIV-RELATED ILLNESS (MULTI): Primary | ICD-10-CM

## 2024-10-10 DIAGNOSIS — E55.9 VITAMIN D DEFICIENCY: ICD-10-CM

## 2024-10-10 PROBLEM — J30.9 ALLERGIC RHINITIS: Status: ACTIVE | Noted: 2024-10-10

## 2024-10-10 LAB
ALBUMIN SERPL BCP-MCNC: 4.5 G/DL (ref 3.4–5)
ALP SERPL-CCNC: 56 U/L (ref 33–120)
ALT SERPL W P-5'-P-CCNC: 17 U/L (ref 10–52)
ANION GAP SERPL CALC-SCNC: 10 MMOL/L (ref 10–20)
AST SERPL W P-5'-P-CCNC: 24 U/L (ref 9–39)
BASOPHILS # BLD AUTO: 0.04 X10*3/UL (ref 0–0.1)
BASOPHILS NFR BLD AUTO: 0.5 %
BILIRUB SERPL-MCNC: 0.8 MG/DL (ref 0–1.2)
BUN SERPL-MCNC: 17 MG/DL (ref 6–23)
CALCIUM SERPL-MCNC: 9.6 MG/DL (ref 8.6–10.6)
CD3+CD4+ CELLS # BLD: 1.62 X10E9/L
CD3+CD4+ CELLS # BLD: 1625 /MM3
CD3+CD4+ CELLS NFR BLD: 54 %
CD3+CD4+ CELLS/CD3+CD8+ CLL BLD: 1.74 %
CD3+CD8+ CELLS # BLD: 0.93 X10E9/L
CD3+CD8+ CELLS NFR BLD: 31 %
CHLORIDE SERPL-SCNC: 103 MMOL/L (ref 98–107)
CO2 SERPL-SCNC: 28 MMOL/L (ref 21–32)
CREAT SERPL-MCNC: 1.23 MG/DL (ref 0.5–1.3)
EGFRCR SERPLBLD CKD-EPI 2021: 80 ML/MIN/1.73M*2
EOSINOPHIL # BLD AUTO: 0.25 X10*3/UL (ref 0–0.7)
EOSINOPHIL NFR BLD AUTO: 3.4 %
ERYTHROCYTE [DISTWIDTH] IN BLOOD BY AUTOMATED COUNT: 13 % (ref 11.5–14.5)
GLUCOSE SERPL-MCNC: 94 MG/DL (ref 74–99)
HCT VFR BLD AUTO: 39.4 % (ref 41–52)
HGB BLD-MCNC: 14 G/DL (ref 13.5–17.5)
IMM GRANULOCYTES # BLD AUTO: 0.02 X10*3/UL (ref 0–0.7)
IMM GRANULOCYTES NFR BLD AUTO: 0.3 % (ref 0–0.9)
LYMPHOCYTES # BLD AUTO: 3.01 X10*3/UL (ref 1.2–4.8)
LYMPHOCYTES # SPEC AUTO: 3.01 X10*3/UL
LYMPHOCYTES NFR BLD AUTO: 40.3 %
MCH RBC QN AUTO: 32.5 PG (ref 26–34)
MCHC RBC AUTO-ENTMCNC: 35.5 G/DL (ref 32–36)
MCV RBC AUTO: 91 FL (ref 80–100)
MONOCYTES # BLD AUTO: 0.73 X10*3/UL (ref 0.1–1)
MONOCYTES NFR BLD AUTO: 9.8 %
NEUTROPHILS # BLD AUTO: 3.41 X10*3/UL (ref 1.2–7.7)
NEUTROPHILS NFR BLD AUTO: 45.7 %
NRBC BLD-RTO: 0 /100 WBCS (ref 0–0)
PLATELET # BLD AUTO: 142 X10*3/UL (ref 150–450)
POTASSIUM SERPL-SCNC: 4 MMOL/L (ref 3.5–5.3)
PROT SERPL-MCNC: 7.2 G/DL (ref 6.4–8.2)
RBC # BLD AUTO: 4.31 X10*6/UL (ref 4.5–5.9)
SODIUM SERPL-SCNC: 137 MMOL/L (ref 136–145)
WBC # BLD AUTO: 7.5 X10*3/UL (ref 4.4–11.3)

## 2024-10-10 PROCEDURE — 4274F FLU IMMUNO ADMIND RCVD: CPT | Performed by: INTERNAL MEDICINE

## 2024-10-10 PROCEDURE — 90656 IIV3 VACC NO PRSV 0.5 ML IM: CPT | Performed by: INTERNAL MEDICINE

## 2024-10-10 PROCEDURE — 85025 COMPLETE CBC W/AUTO DIFF WBC: CPT | Performed by: INTERNAL MEDICINE

## 2024-10-10 PROCEDURE — 88185 FLOWCYTOMETRY/TC ADD-ON: CPT | Performed by: INTERNAL MEDICINE

## 2024-10-10 PROCEDURE — 99214 OFFICE O/P EST MOD 30 MIN: CPT | Performed by: INTERNAL MEDICINE

## 2024-10-10 PROCEDURE — 36415 COLL VENOUS BLD VENIPUNCTURE: CPT | Performed by: INTERNAL MEDICINE

## 2024-10-10 PROCEDURE — 80053 COMPREHEN METABOLIC PANEL: CPT | Performed by: INTERNAL MEDICINE

## 2024-10-10 PROCEDURE — 87536 HIV-1 QUANT&REVRSE TRNSCRPJ: CPT | Performed by: INTERNAL MEDICINE

## 2024-10-10 RX ORDER — LORATADINE 10 MG/1
10 TABLET ORAL
Qty: 90 TABLET | Refills: 1 | Status: SHIPPED | OUTPATIENT
Start: 2024-10-10

## 2024-10-10 ASSESSMENT — ENCOUNTER SYMPTOMS
JOINT SWELLING: 0
HALLUCINATIONS: 0
HEMATOLOGIC/LYMPHATIC NEGATIVE: 1
DYSURIA: 0
PHOTOPHOBIA: 0
SORE THROAT: 0
GASTROINTESTINAL NEGATIVE: 1
MYALGIAS: 0
EYE ITCHING: 0
CHILLS: 0
WHEEZING: 0
HYPERACTIVE: 0
NUMBNESS: 0
CONSTIPATION: 0
TREMORS: 0
APPETITE CHANGE: 0
HEMATURIA: 0
FATIGUE: 0
BACK PAIN: 0
ALLERGIC/IMMUNOLOGIC NEGATIVE: 1
VOMITING: 0
ADENOPATHY: 0
FEVER: 0
EYES NEGATIVE: 1
RECTAL PAIN: 0
NECK STIFFNESS: 0
BRUISES/BLEEDS EASILY: 0
NEUROLOGICAL NEGATIVE: 1
FREQUENCY: 0
EYE REDNESS: 0
CONSTITUTIONAL NEGATIVE: 1
SHORTNESS OF BREATH: 0
DIFFICULTY URINATING: 0
DIAPHORESIS: 0
ABDOMINAL DISTENTION: 0
CARDIOVASCULAR NEGATIVE: 1
DYSPHORIC MOOD: 0
DIZZINESS: 0
NAUSEA: 0
COLOR CHANGE: 0
SLEEP DISTURBANCE: 0
WOUND: 0
DECREASED CONCENTRATION: 0
NECK PAIN: 0
EYE DISCHARGE: 0
SINUS PAIN: 0
FLANK PAIN: 0
ENDOCRINE NEGATIVE: 1
CONFUSION: 0
COUGH: 0
HEADACHES: 0
SINUS PRESSURE: 0
SPEECH DIFFICULTY: 0
NERVOUS/ANXIOUS: 0
ABDOMINAL PAIN: 0
EYE PAIN: 0
ARTHRALGIAS: 0
LIGHT-HEADEDNESS: 0
RHINORRHEA: 1
MUSCULOSKELETAL NEGATIVE: 1
DIARRHEA: 0
PSYCHIATRIC NEGATIVE: 1
BLOOD IN STOOL: 0
ANAL BLEEDING: 0
SEIZURES: 0

## 2024-10-10 ASSESSMENT — PAIN SCALES - GENERAL: PAINLEVEL: 0-NO PAIN

## 2024-10-10 NOTE — PROGRESS NOTES
HIV Clinic Visit:   Odilia Villegas is a 34 y.o. male was last seen in [facility] on today.    Missed antiretroviral doses in last 72 hours? No    Sexually active? Yes Partner/s aware of diagnosis? Yes    Condom use? Yes Partner on PrEP? No    Tobacco use: Cigars    SUBJECTIVE: Mr Villegas comes for a routine follow up visit. He denies any complains. He is excited because he plans to become a .        Review of Systems   Constitutional: Negative.  Negative for appetite change, chills, diaphoresis, fatigue and fever.   HENT:  Positive for rhinorrhea. Negative for congestion, dental problem, ear discharge, ear pain, hearing loss, mouth sores, nosebleeds, sinus pressure, sinus pain, sneezing, sore throat and tinnitus.    Eyes: Negative.  Negative for photophobia, pain, discharge, redness, itching and visual disturbance.   Respiratory:  Negative for cough, shortness of breath and wheezing.    Cardiovascular: Negative.  Negative for chest pain.   Gastrointestinal: Negative.  Negative for abdominal distention, abdominal pain, anal bleeding, blood in stool, constipation, diarrhea, nausea, rectal pain and vomiting.   Endocrine: Negative.    Genitourinary: Negative.  Negative for difficulty urinating, dysuria, enuresis, flank pain, frequency, genital sores, hematuria and urgency.   Musculoskeletal: Negative.  Negative for arthralgias, back pain, gait problem, joint swelling, myalgias, neck pain and neck stiffness.   Skin: Negative.  Negative for color change, pallor, rash and wound.   Allergic/Immunologic: Negative.    Neurological: Negative.  Negative for dizziness, tremors, seizures, syncope, speech difficulty, light-headedness, numbness and headaches.   Hematological: Negative.  Negative for adenopathy. Does not bruise/bleed easily.   Psychiatric/Behavioral: Negative.  Negative for behavioral problems, confusion, decreased concentration, dysphoric mood, hallucinations, self-injury and sleep disturbance. The patient is not  nervous/anxious and is not hyperactive.          Objective   Visit Vitals  /65   Pulse 59   Resp 16   Wt 65.9 kg (145 lb 3.2 oz)   SpO2 100%   BMI 20.25 kg/m²   Smoking Status Every Day   BSA 1.82 m²        Physical Exam  Constitutional:       Appearance: Normal appearance.   HENT:      Head: Normocephalic and atraumatic.      Right Ear: Tympanic membrane normal.      Left Ear: Tympanic membrane normal.      Nose: Nose normal.      Mouth/Throat:      Mouth: Mucous membranes are moist.      Tongue: No lesions.      Palate: No mass.      Pharynx: Oropharynx is clear. No pharyngeal swelling, oropharyngeal exudate, posterior oropharyngeal erythema or uvula swelling.      Tonsils: No tonsillar exudate.   Eyes:      General: Lids are normal.      Extraocular Movements: Extraocular movements intact.      Conjunctiva/sclera: Conjunctivae normal.      Pupils: Pupils are equal, round, and reactive to light.   Neck:      Thyroid: No thyroid mass or thyroid tenderness.      Vascular: Normal carotid pulses. No carotid bruit or JVD.      Trachea: Trachea normal.   Cardiovascular:      Rate and Rhythm: Normal rate and regular rhythm.      Pulses: Normal pulses.      Heart sounds: Normal heart sounds.   Pulmonary:      Effort: Pulmonary effort is normal.      Breath sounds: Normal breath sounds.   Abdominal:      General: Abdomen is flat. Bowel sounds are normal.      Palpations: Abdomen is soft. There is no hepatomegaly, splenomegaly or mass.      Tenderness: There is no abdominal tenderness.   Musculoskeletal:         General: No swelling, tenderness, deformity or signs of injury. Normal range of motion.      Cervical back: Full passive range of motion without pain, normal range of motion and neck supple.      Right lower leg: No edema.      Left lower leg: No edema.   Lymphadenopathy:      Cervical: No cervical adenopathy.   Skin:     General: Skin is warm and dry.   Neurological:      General: No focal deficit present.     "  Mental Status: He is alert and oriented to person, place, and time.   Psychiatric:         Mood and Affect: Mood normal.         CURRENT MEDICATIONS    Current Outpatient Medications:     Biktarvy -25 mg tablet, Take 1 tablet by mouth once daily., Disp: 30 tablet, Rfl: 2    cholecalciferol (Vitamin D-3) 50 MCG (2000 UT) tablet, Take 1 tablet (2,000 Units) by mouth once daily., Disp: , Rfl:     loratadine (Claritin) 10 mg tablet, Take 1 tablet (10 mg) by mouth once daily., Disp: , Rfl:        Relevant Results  Labs  Lab Results   Component Value Date    PDO2YWKTI NOT DETECTED 08/24/2023    SA6TCE4OAZC 1.155 08/29/2024    VITD25 44 08/29/2024      Lab Results   Component Value Date    WBC 8.3 08/29/2024    HGB 15.4 08/29/2024    HCT 44.5 08/29/2024    MCV 93 08/29/2024     (L) 08/29/2024      Lab Results   Component Value Date    GLUCOSE 60 (L) 08/29/2024    CALCIUM 9.9 08/29/2024     08/29/2024    K 4.6 08/29/2024    CO2 26 08/29/2024     08/29/2024    BUN 14 08/29/2024    CREATININE 1.20 08/29/2024      Lab Results   Component Value Date    CHOL 121 08/24/2023    CHOL 111 11/04/2021    CHOL 118 10/08/2020     Lab Results   Component Value Date    HDL 49.3 08/24/2023    HDL 47.0 11/04/2021    HDL 53.1 10/08/2020     No results found for: \"LDLCALC\"  Lab Results   Component Value Date    TRIG 54 08/24/2023    TRIG 54 11/04/2021    TRIG 26 (LL) 10/08/2020     No components found for: \"CHOLHDL\"       Assessment/Plan   Problem List Items Addressed This Visit       Asymptomatic HIV infection, with no history of HIV-related illness (Multi) - Primary    Current Assessment & Plan     Mr Villegas had issues getting ART due to lack of insurance coverage. He believes that he was off ART for about 3 weeks. He began to take ART 3 weeks ago and has been compliant with ART. His VL was undetectable in August of this year.         Pap smear of anus with ASCUS    Overview     Seen by Dr Ulloa earlier this " year. Anoscopy performed. Pathology showed minimal inflammation, likely trauma induced.         Current Assessment & Plan     Seen by DR Ulloa in January. No lesions observed. He is due for follow up.         Relevant Orders    Referral to Colorectal Surgery    Vitamin D deficiency    Overview     Mr Villegas is an chronic Vitamin D supplementation. Vit D level has been adequate.         Current Assessment & Plan     Continues to be on vitamin D supplementation.         Allergic rhinitis    Current Assessment & Plan     He has not taken Claritin in a  while but would like to take it regularly due to recurrent rhinorrhea.          Other Visit Diagnoses       HIV infection, unspecified symptom status        Relevant Orders    CBC and Auto Differential    CD4/8 Panel    Comprehensive Metabolic Panel    HIV RNA, quantitative, PCR    Need for vaccination        Relevant Orders    Flu vaccine, trivalent, preservative free, age 6 months and greater (Fluarix/Fluzone/Flulaval)    Dysplasia of anus        Relevant Orders    Referral to Colorectal Surgery               Conner Montemayor MD

## 2024-10-10 NOTE — ASSESSMENT & PLAN NOTE
Mr Bakari had issues getting ART due to lack of insurance coverage. He believes that he was off ART for about 3 weeks. He began to take ART 3 weeks ago and has been compliant with ART. His VL was undetectable in August of this year.

## 2024-10-10 NOTE — ASSESSMENT & PLAN NOTE
He has not taken Claritin in a  while but would like to take it regularly due to recurrent rhinorrhea.

## 2024-10-10 NOTE — PROGRESS NOTES
"Nutrition Assessment     Reason for Visit:  Shay Villegas is a 34 y.o. male who was seen today for follow-up.      Food And Nutrient Intake:  Food and Nutrient History: Pt was seen today for follow-up. He continues to progress well per his report. He denied food insecurity and stated that he has a stable job and adequate food access. He is prioritizing regular exercise now and stated that he is now going to prioritize consuming more fruits and vegetables. He had no questions or concerns at this time.     Nutrition Diagnosis   Nutrition Diagnosis  Diagnosis Status (1): Resolved  Nutrition Diagnosis 1: Limited access to food  Related to (1): socioeconomic limitations  As Evidenced by (1): pt interview, pt responding \"Yes\" to one or both food insecurity questions on [facility] intake questionnaire    Nutrition Interventions/Recommendations   Food and Nutrition Delivery  Goals: Pt was again encouraged to contact this RD or the [facility] should he experience food insecurity in the future.    Nutrition Monitoring and Evaluation   Food/Nutrient Related History Monitoring  Additional Plan: Pt continues with adequate food access. Pt will reach out to this RD should he experience food insecurity in the future.     Follow-up   Pt continues with adequate food access at this time. This RD to no longer follow.      Time spent with patient: 15 minutes of which 50 percent or greater was spent counseling and or coordinating care.                 "

## 2024-10-11 LAB
HIV1 RNA # PLAS NAA DL=20: NOT DETECTED {COPIES}/ML
HIV1 RNA SPEC NAA+PROBE-LOG#: NORMAL {LOG_COPIES}/ML

## 2024-12-11 ENCOUNTER — APPOINTMENT (OUTPATIENT)
Dept: SURGERY | Facility: CLINIC | Age: 34
End: 2024-12-11
Payer: COMMERCIAL

## 2024-12-29 DIAGNOSIS — Z21 HIV INFECTION, UNSPECIFIED SYMPTOM STATUS: ICD-10-CM

## 2024-12-30 RX ORDER — BICTEGRAVIR SODIUM, EMTRICITABINE, AND TENOFOVIR ALAFENAMIDE FUMARATE 50; 200; 25 MG/1; MG/1; MG/1
1 TABLET ORAL DAILY
Qty: 30 TABLET | Refills: 5 | Status: SHIPPED | OUTPATIENT
Start: 2024-12-30

## 2025-01-17 NOTE — PROGRESS NOTES
"No chief complaint on file.      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  he  has not has not noticed any new lesions or changes.     HIV status: positive- CD4 WNL and VL UD  Smoking status: current smoker    Previous High Resolution Anoscopies and Cytologies have shown:     6/2015 PAP: Negative  6/2016 PAP: ASCUS  10/2016 HRA: Condyloma with moderate dysplasia  6/2023 PAP: LSIL  7/2023 HRA: squamous mucosa with mild superficial neutrophilic infiltrate       Past Medical History  He  Past Medical History:   Diagnosis Date    HIV (human immunodeficiency virus infection)     Vitamin D deficiency        Surgical History  He  Past Surgical History:   Procedure Laterality Date    OTHER SURGICAL HISTORY  04/26/2017    Anal Surgery        Social History  He reports that he has been smoking cigars. He has never used smokeless tobacco. He reports current alcohol use. He reports current drug use. Drug: Marijuana.    Family History  No family history on file.     Allergies  Shellfish containing products and Pollen extracts    Home Medications  Prior to Admission medications    Medication Sig Start Date End Date Taking? Authorizing Provider   Biktarvy -25 mg tablet Take 1 tablet by mouth once daily. 12/28/23   Conner Montemayor MD   cholecalciferol (Vitamin D-3) 50 MCG (2000 UT) tablet Take 1 tablet (2,000 Units) by mouth once daily.    Historical Provider, MD   loratadine (Claritin) 10 mg tablet Take 1 tablet (10 mg) by mouth once daily.    Historical Provider, MD       Review of Systems     Physical Exam    Perineal/SAVANA:  Perineum: normal  SAVANA: normal  Tone: normal    Anoscopy    Date/Time: 1/23/2025 9:49 AM    Performed by: Shameka Still MD  Authorized by: Shameka Still MD    Consent:     Consent obtained:  Verbal    Consent given by:  Patient    Risks, benefits, and alternatives were discussed: yes      Risks discussed:  " Bleeding and pain    Alternatives discussed:  No treatment  Universal protocol:     Procedure explained and questions answered to patient or proxy's satisfaction: yes      Patient identity confirmed:  Verbally with patient  Indications:     Indications comment:  Anal dysplasia  Post-procedure details:     Procedure completion:  Tolerated well, no immediate complications      Last Recorded Vitals  There were no vitals taken for this visit.      Assessment and Plan  35 y.o. male here for follow up of anal dysplasia.     Normal exam. Follow up 6 months or sooner, depending on cytology.     Discussed smoking cessation and encouraged him to cut down to eventually quit. He is smoking somewhat less (3-5 per day)

## 2025-01-23 ENCOUNTER — APPOINTMENT (OUTPATIENT)
Dept: SURGERY | Facility: CLINIC | Age: 35
End: 2025-01-23
Payer: COMMERCIAL

## 2025-01-23 VITALS
HEART RATE: 111 BPM | BODY MASS INDEX: 19.57 KG/M2 | DIASTOLIC BLOOD PRESSURE: 74 MMHG | RESPIRATION RATE: 16 BRPM | HEIGHT: 72 IN | WEIGHT: 144.5 LBS | SYSTOLIC BLOOD PRESSURE: 119 MMHG

## 2025-01-23 DIAGNOSIS — K62.82 DYSPLASIA OF ANUS: ICD-10-CM

## 2025-01-23 DIAGNOSIS — R85.610 PAP SMEAR OF ANUS WITH ASCUS: ICD-10-CM

## 2025-01-23 PROCEDURE — 88112 CYTOPATH CELL ENHANCE TECH: CPT

## 2025-01-23 PROCEDURE — 88112 CYTOPATH CELL ENHANCE TECH: CPT | Performed by: PATHOLOGY

## 2025-01-23 ASSESSMENT — PAIN SCALES - GENERAL: PAINLEVEL_OUTOF10: 0-NO PAIN

## 2025-01-24 LAB
LABORATORY COMMENT REPORT: NORMAL
LABORATORY COMMENT REPORT: NORMAL
PATH REPORT.FINAL DX SPEC: NORMAL
PATH REPORT.GROSS SPEC: NORMAL
PATH REPORT.RELEVANT HX SPEC: NORMAL
PATH REPORT.TOTAL CANCER: NORMAL
RESIDENT REVIEW: NORMAL

## 2025-01-29 ENCOUNTER — TELEPHONE (OUTPATIENT)
Dept: SURGERY | Facility: CLINIC | Age: 35
End: 2025-01-29
Payer: COMMERCIAL

## 2025-01-29 NOTE — TELEPHONE ENCOUNTER
Spoke with Shay regarding his PAP results per Dr. Ulloa. He is scheduled for an HRA on 2/21. Pre-operative instructions reviewed.

## 2025-01-30 ENCOUNTER — PREP FOR PROCEDURE (OUTPATIENT)
Dept: SURGERY | Facility: HOSPITAL | Age: 35
End: 2025-01-30
Payer: COMMERCIAL

## 2025-01-30 ENCOUNTER — HOSPITAL ENCOUNTER (OUTPATIENT)
Facility: CLINIC | Age: 35
Setting detail: OUTPATIENT SURGERY
End: 2025-01-30
Attending: SURGERY | Admitting: SURGERY
Payer: COMMERCIAL

## 2025-01-30 DIAGNOSIS — K62.82 ANAL DYSPLASIA: Primary | ICD-10-CM

## 2025-02-13 ENCOUNTER — OFFICE VISIT (OUTPATIENT)
Dept: IMMUNOLOGY | Facility: CLINIC | Age: 35
End: 2025-02-13
Payer: COMMERCIAL

## 2025-02-13 VITALS
OXYGEN SATURATION: 97 % | HEIGHT: 70 IN | HEART RATE: 88 BPM | RESPIRATION RATE: 16 BRPM | TEMPERATURE: 97.9 F | SYSTOLIC BLOOD PRESSURE: 109 MMHG | BODY MASS INDEX: 21.02 KG/M2 | DIASTOLIC BLOOD PRESSURE: 65 MMHG | WEIGHT: 146.8 LBS

## 2025-02-13 DIAGNOSIS — B20 HIV DISEASE (MULTI): Primary | ICD-10-CM

## 2025-02-13 DIAGNOSIS — K62.82 ANAL DYSPLASIA: ICD-10-CM

## 2025-02-13 DIAGNOSIS — Z21 ASYMPTOMATIC HIV INFECTION, WITH NO HISTORY OF HIV-RELATED ILLNESS (MULTI): ICD-10-CM

## 2025-02-13 DIAGNOSIS — E55.9 VITAMIN D DEFICIENCY: ICD-10-CM

## 2025-02-13 LAB
ALBUMIN SERPL BCP-MCNC: 4.3 G/DL (ref 3.4–5)
ALP SERPL-CCNC: 58 U/L (ref 33–120)
ALT SERPL W P-5'-P-CCNC: 17 U/L (ref 10–52)
ANION GAP SERPL CALC-SCNC: 14 MMOL/L (ref 10–20)
AST SERPL W P-5'-P-CCNC: 24 U/L (ref 9–39)
BASOPHILS # BLD AUTO: 0.04 X10*3/UL (ref 0–0.1)
BASOPHILS NFR BLD AUTO: 0.5 %
BILIRUB SERPL-MCNC: 0.5 MG/DL (ref 0–1.2)
BUN SERPL-MCNC: 17 MG/DL (ref 6–23)
CALCIUM SERPL-MCNC: 9.3 MG/DL (ref 8.6–10.6)
CD3+CD4+ CELLS # BLD: 1.39 X10E9/L
CD3+CD4+ CELLS # BLD: 1390 /MM3
CD3+CD4+ CELLS NFR BLD: 50 %
CD3+CD4+ CELLS/CD3+CD8+ CLL BLD: 1.47 %
CD3+CD8+ CELLS # BLD: 0.94 X10E9/L
CD3+CD8+ CELLS NFR BLD: 34 %
CHLORIDE SERPL-SCNC: 104 MMOL/L (ref 98–107)
CO2 SERPL-SCNC: 24 MMOL/L (ref 21–32)
CREAT SERPL-MCNC: 1.08 MG/DL (ref 0.5–1.3)
EGFRCR SERPLBLD CKD-EPI 2021: >90 ML/MIN/1.73M*2
EOSINOPHIL # BLD AUTO: 0.16 X10*3/UL (ref 0–0.7)
EOSINOPHIL NFR BLD AUTO: 1.9 %
ERYTHROCYTE [DISTWIDTH] IN BLOOD BY AUTOMATED COUNT: 13.1 % (ref 11.5–14.5)
GLUCOSE SERPL-MCNC: 93 MG/DL (ref 74–99)
HCT VFR BLD AUTO: 40.2 % (ref 41–52)
HGB BLD-MCNC: 14 G/DL (ref 13.5–17.5)
IMM GRANULOCYTES # BLD AUTO: 0.04 X10*3/UL (ref 0–0.7)
IMM GRANULOCYTES NFR BLD AUTO: 0.5 % (ref 0–0.9)
LYMPHOCYTES # BLD AUTO: 2.78 X10*3/UL (ref 1.2–4.8)
LYMPHOCYTES # SPEC AUTO: 2.78 X10*3/UL
LYMPHOCYTES NFR BLD AUTO: 33.6 %
MCH RBC QN AUTO: 32.4 PG (ref 26–34)
MCHC RBC AUTO-ENTMCNC: 34.8 G/DL (ref 32–36)
MCV RBC AUTO: 93 FL (ref 80–100)
MONOCYTES # BLD AUTO: 0.76 X10*3/UL (ref 0.1–1)
MONOCYTES NFR BLD AUTO: 9.2 %
NEUTROPHILS # BLD AUTO: 4.49 X10*3/UL (ref 1.2–7.7)
NEUTROPHILS NFR BLD AUTO: 54.3 %
NRBC BLD-RTO: 0 /100 WBCS (ref 0–0)
PLATELET # BLD AUTO: 124 X10*3/UL (ref 150–450)
POTASSIUM SERPL-SCNC: 4 MMOL/L (ref 3.5–5.3)
PROT SERPL-MCNC: 6.9 G/DL (ref 6.4–8.2)
RBC # BLD AUTO: 4.32 X10*6/UL (ref 4.5–5.9)
SODIUM SERPL-SCNC: 138 MMOL/L (ref 136–145)
TREPONEMA PALLIDUM IGG+IGM AB [PRESENCE] IN SERUM OR PLASMA BY IMMUNOASSAY: NONREACTIVE
WBC # BLD AUTO: 8.3 X10*3/UL (ref 4.4–11.3)

## 2025-02-13 PROCEDURE — 36415 COLL VENOUS BLD VENIPUNCTURE: CPT | Performed by: INTERNAL MEDICINE

## 2025-02-13 PROCEDURE — 84075 ASSAY ALKALINE PHOSPHATASE: CPT | Performed by: INTERNAL MEDICINE

## 2025-02-13 PROCEDURE — 87536 HIV-1 QUANT&REVRSE TRNSCRPJ: CPT | Performed by: INTERNAL MEDICINE

## 2025-02-13 PROCEDURE — 99213 OFFICE O/P EST LOW 20 MIN: CPT | Performed by: INTERNAL MEDICINE

## 2025-02-13 PROCEDURE — 88185 FLOWCYTOMETRY/TC ADD-ON: CPT | Performed by: INTERNAL MEDICINE

## 2025-02-13 PROCEDURE — 3008F BODY MASS INDEX DOCD: CPT | Performed by: INTERNAL MEDICINE

## 2025-02-13 PROCEDURE — 86780 TREPONEMA PALLIDUM: CPT | Performed by: INTERNAL MEDICINE

## 2025-02-13 PROCEDURE — 87591 N.GONORRHOEAE DNA AMP PROB: CPT | Performed by: INTERNAL MEDICINE

## 2025-02-13 PROCEDURE — 85025 COMPLETE CBC W/AUTO DIFF WBC: CPT | Performed by: INTERNAL MEDICINE

## 2025-02-13 ASSESSMENT — ENCOUNTER SYMPTOMS
JOINT SWELLING: 0
BRUISES/BLEEDS EASILY: 0
ARTHRALGIAS: 0
CHILLS: 0
LIGHT-HEADEDNESS: 0
SEIZURES: 0
EYES NEGATIVE: 1
HEMATURIA: 0
NEUROLOGICAL NEGATIVE: 1
DIFFICULTY URINATING: 0
EYE ITCHING: 0
NERVOUS/ANXIOUS: 0
BLOOD IN STOOL: 0
VOMITING: 0
BACK PAIN: 0
ANAL BLEEDING: 0
WOUND: 0
NECK STIFFNESS: 0
ALLERGIC/IMMUNOLOGIC NEGATIVE: 1
CONSTITUTIONAL NEGATIVE: 1
RECTAL PAIN: 0
HEMATOLOGIC/LYMPHATIC NEGATIVE: 1
EYE DISCHARGE: 0
EYE REDNESS: 0
EYE PAIN: 0
FEVER: 0
WHEEZING: 0
DIZZINESS: 0
HALLUCINATIONS: 0
MYALGIAS: 0
SINUS PAIN: 0
HEADACHES: 0
PSYCHIATRIC NEGATIVE: 1
GASTROINTESTINAL NEGATIVE: 1
ABDOMINAL DISTENTION: 0
SINUS PRESSURE: 0
ENDOCRINE NEGATIVE: 1
TREMORS: 0
FATIGUE: 0
COUGH: 0
CONFUSION: 0
DIAPHORESIS: 0
SLEEP DISTURBANCE: 0
DIARRHEA: 0
NAUSEA: 0
FLANK PAIN: 0
DYSURIA: 0
ADENOPATHY: 0
COLOR CHANGE: 0
SPEECH DIFFICULTY: 0
SHORTNESS OF BREATH: 0
DECREASED CONCENTRATION: 0
SORE THROAT: 0
DYSPHORIC MOOD: 0
CONSTIPATION: 0
FREQUENCY: 0
NECK PAIN: 0
APPETITE CHANGE: 0
RHINORRHEA: 0
ABDOMINAL PAIN: 0
MUSCULOSKELETAL NEGATIVE: 1
PHOTOPHOBIA: 0
HYPERACTIVE: 0
CARDIOVASCULAR NEGATIVE: 1
NUMBNESS: 0

## 2025-02-13 ASSESSMENT — PAIN SCALES - GENERAL: PAINLEVEL_OUTOF10: 0-NO PAIN

## 2025-02-13 NOTE — PROGRESS NOTES
"HIV Clinic Visit:   Odilia Villegas \"Shay\" is a 35 y.o. male was last seen in [facility] on today.    Missed antiretroviral doses in last 72 hours? No    Sexually active? Yes Partner/s aware of diagnosis? Yes    Condom use? No Partner on PrEP? No    Tobacco use: No    SUBJECTIVE: Mr Villegas comes for a routine follow up visit. He has not missed any doses of his ART. H denies any complains.         Review of Systems   Constitutional: Negative.  Negative for appetite change, chills, diaphoresis, fatigue and fever.   HENT: Negative.  Negative for congestion, dental problem, ear discharge, ear pain, hearing loss, mouth sores, nosebleeds, rhinorrhea, sinus pressure, sinus pain, sneezing, sore throat and tinnitus.    Eyes: Negative.  Negative for photophobia, pain, discharge, redness, itching and visual disturbance.   Respiratory:  Negative for cough, shortness of breath and wheezing.    Cardiovascular: Negative.  Negative for chest pain.   Gastrointestinal: Negative.  Negative for abdominal distention, abdominal pain, anal bleeding, blood in stool, constipation, diarrhea, nausea, rectal pain and vomiting.   Endocrine: Negative.    Genitourinary: Negative.  Negative for difficulty urinating, dysuria, enuresis, flank pain, frequency, genital sores, hematuria and urgency.   Musculoskeletal: Negative.  Negative for arthralgias, back pain, gait problem, joint swelling, myalgias, neck pain and neck stiffness.   Skin: Negative.  Negative for color change, pallor, rash and wound.   Allergic/Immunologic: Negative.    Neurological: Negative.  Negative for dizziness, tremors, seizures, syncope, speech difficulty, light-headedness, numbness and headaches.   Hematological: Negative.  Negative for adenopathy. Does not bruise/bleed easily.   Psychiatric/Behavioral: Negative.  Negative for behavioral problems, confusion, decreased concentration, dysphoric mood, hallucinations, self-injury and sleep disturbance. The patient is not nervous/anxious " "and is not hyperactive.          Objective   Visit Vitals  /65 (BP Location: Left arm, Patient Position: Sitting, BP Cuff Size: Adult)   Pulse 88   Temp 36.6 °C (97.9 °F) (Skin)   Resp 16   Ht 1.778 m (5' 10\")   Wt 66.6 kg (146 lb 12.8 oz)   SpO2 97%   BMI 21.06 kg/m²   Smoking Status Every Day   BSA 1.81 m²        Physical Exam  Constitutional:       Appearance: Normal appearance.   HENT:      Head: Normocephalic and atraumatic.      Right Ear: Tympanic membrane normal.      Left Ear: Tympanic membrane normal.      Nose: Nose normal.      Mouth/Throat:      Mouth: Mucous membranes are moist.      Tongue: No lesions.      Palate: No mass.      Pharynx: Oropharynx is clear. No pharyngeal swelling, oropharyngeal exudate, posterior oropharyngeal erythema or uvula swelling.      Tonsils: No tonsillar exudate.   Eyes:      General: Lids are normal.      Extraocular Movements: Extraocular movements intact.      Conjunctiva/sclera: Conjunctivae normal.      Pupils: Pupils are equal, round, and reactive to light.   Neck:      Thyroid: No thyroid mass or thyroid tenderness.      Vascular: Normal carotid pulses. No carotid bruit or JVD.      Trachea: Trachea normal.   Cardiovascular:      Rate and Rhythm: Normal rate and regular rhythm.      Pulses: Normal pulses.      Heart sounds: Normal heart sounds.   Pulmonary:      Effort: Pulmonary effort is normal.      Breath sounds: Normal breath sounds.   Abdominal:      General: Abdomen is flat. Bowel sounds are normal.      Palpations: Abdomen is soft. There is no hepatomegaly, splenomegaly or mass.      Tenderness: There is no abdominal tenderness.   Musculoskeletal:         General: No swelling, tenderness, deformity or signs of injury. Normal range of motion.      Cervical back: Full passive range of motion without pain, normal range of motion and neck supple.      Right lower leg: No edema.      Left lower leg: No edema.   Lymphadenopathy:      Cervical: No cervical " "adenopathy.   Skin:     General: Skin is warm and dry.   Neurological:      General: No focal deficit present.      Mental Status: He is alert and oriented to person, place, and time.   Psychiatric:         Mood and Affect: Mood normal.         CURRENT MEDICATIONS    Current Outpatient Medications:     Biktarvy -25 mg tablet, Take 1 tablet by mouth once daily., Disp: 30 tablet, Rfl: 5    cholecalciferol (Vitamin D-3) 50 MCG (2000 UT) tablet, Take 1 tablet (2,000 Units) by mouth once daily., Disp: , Rfl:     loratadine (Claritin) 10 mg tablet, Take 1 tablet (10 mg) by mouth once daily., Disp: 90 tablet, Rfl: 1       Relevant Results  Labs  Lab Results   Component Value Date    [id number] NOT DETECTED 08/24/2023    AW4HTP0CPVZ 0.933 10/10/2024    VITD25 44 08/29/2024      Lab Results   Component Value Date    WBC 7.5 10/10/2024    HGB 14.0 10/10/2024    HCT 39.4 (L) 10/10/2024    MCV 91 10/10/2024     (L) 10/10/2024      Lab Results   Component Value Date    GLUCOSE 94 10/10/2024    CALCIUM 9.6 10/10/2024     10/10/2024    K 4.0 10/10/2024    CO2 28 10/10/2024     10/10/2024    BUN 17 10/10/2024    CREATININE 1.23 10/10/2024      Lab Results   Component Value Date    CHOL 121 08/24/2023    CHOL 111 11/04/2021    CHOL 118 10/08/2020     Lab Results   Component Value Date    HDL 49.3 08/24/2023    HDL 47.0 11/04/2021    HDL 53.1 10/08/2020     No results found for: \"LDLCALC\"  Lab Results   Component Value Date    TRIG 54 08/24/2023    TRIG 54 11/04/2021    TRIG 26 (LL) 10/08/2020     No components found for: \"CHOLHDL\"       Assessment/Plan   Problem List Items Addressed This Visit       HIV disease (Multi) - Primary    Vitamin D deficiency    Overview     Mr Villegas is an chronic Vitamin D supplementation. Vit D level has been adequate.         Current Assessment & Plan     Mr Villegas is on Vitamin D supplementation. Adequate 25 OH Vit D level.         Anal dysplasia    Current Assessment & Plan     " Found to have anal dysplasia. Has appointment with Dr Ulloa.           Spent 20 minutes reviewing the chart, talking and examining the patient, going over all results with him an discussing the treatment plan with him and nursing staff.    Conner Montemayor MD

## 2025-02-14 LAB
C TRACH RRNA SPEC QL NAA+PROBE: NEGATIVE
HIV1 RNA # PLAS NAA DL=20: NOT DETECTED {COPIES}/ML
HIV1 RNA SPEC NAA+PROBE-LOG#: NORMAL {LOG_COPIES}/ML
N GONORRHOEA DNA SPEC QL PROBE+SIG AMP: NEGATIVE

## 2025-03-11 ENCOUNTER — PREP FOR PROCEDURE (OUTPATIENT)
Dept: SURGERY | Facility: HOSPITAL | Age: 35
End: 2025-03-11
Payer: COMMERCIAL

## 2025-03-11 ENCOUNTER — TELEPHONE (OUTPATIENT)
Dept: SURGERY | Facility: CLINIC | Age: 35
End: 2025-03-11
Payer: COMMERCIAL

## 2025-03-11 DIAGNOSIS — K62.82 ANAL DYSPLASIA: Primary | ICD-10-CM

## 2025-03-11 NOTE — TELEPHONE ENCOUNTER
Called patient to get rescheduled for his procedure with Dr. Badillo. He did not answer so I left a message with my direct line asking him to call back to get scheduled.

## 2025-03-17 ENCOUNTER — PREP FOR PROCEDURE (OUTPATIENT)
Dept: SURGERY | Facility: HOSPITAL | Age: 35
End: 2025-03-17
Payer: COMMERCIAL

## 2025-03-20 ENCOUNTER — ANESTHESIA EVENT (OUTPATIENT)
Dept: OPERATING ROOM | Facility: CLINIC | Age: 35
End: 2025-03-20
Payer: COMMERCIAL

## 2025-03-21 ENCOUNTER — HOSPITAL ENCOUNTER (OUTPATIENT)
Facility: CLINIC | Age: 35
Setting detail: OUTPATIENT SURGERY
Discharge: HOME | End: 2025-03-21
Attending: SURGERY | Admitting: SURGERY
Payer: COMMERCIAL

## 2025-03-21 ENCOUNTER — ANESTHESIA (OUTPATIENT)
Dept: OPERATING ROOM | Facility: CLINIC | Age: 35
End: 2025-03-21
Payer: COMMERCIAL

## 2025-03-21 VITALS
BODY MASS INDEX: 20.43 KG/M2 | WEIGHT: 145.94 LBS | HEIGHT: 71 IN | DIASTOLIC BLOOD PRESSURE: 70 MMHG | HEART RATE: 64 BPM | OXYGEN SATURATION: 98 % | RESPIRATION RATE: 16 BRPM | SYSTOLIC BLOOD PRESSURE: 106 MMHG | TEMPERATURE: 97.2 F

## 2025-03-21 DIAGNOSIS — K62.82 ANAL DYSPLASIA: Primary | ICD-10-CM

## 2025-03-21 PROCEDURE — 3700000002 HC GENERAL ANESTHESIA TIME - EACH INCREMENTAL 1 MINUTE: Performed by: SURGERY

## 2025-03-21 PROCEDURE — 7100000010 HC PHASE TWO TIME - EACH INCREMENTAL 1 MINUTE: Performed by: SURGERY

## 2025-03-21 PROCEDURE — 2500000004 HC RX 250 GENERAL PHARMACY W/ HCPCS (ALT 636 FOR OP/ED): Performed by: ANESTHESIOLOGIST ASSISTANT

## 2025-03-21 PROCEDURE — 7100000009 HC PHASE TWO TIME - INITIAL BASE CHARGE: Performed by: SURGERY

## 2025-03-21 PROCEDURE — 7100000002 HC RECOVERY ROOM TIME - EACH INCREMENTAL 1 MINUTE: Performed by: SURGERY

## 2025-03-21 PROCEDURE — 3600000003 HC OR TIME - INITIAL BASE CHARGE - PROCEDURE LEVEL THREE: Performed by: SURGERY

## 2025-03-21 PROCEDURE — 2500000001 HC RX 250 WO HCPCS SELF ADMINISTERED DRUGS (ALT 637 FOR MEDICARE OP): Performed by: ANESTHESIOLOGY

## 2025-03-21 PROCEDURE — 2500000005 HC RX 250 GENERAL PHARMACY W/O HCPCS: Performed by: SURGERY

## 2025-03-21 PROCEDURE — 3600000008 HC OR TIME - EACH INCREMENTAL 1 MINUTE - PROCEDURE LEVEL THREE: Performed by: SURGERY

## 2025-03-21 PROCEDURE — 46607 DIAGNOSTIC ANOSCOPY & BIOPSY: CPT | Performed by: SURGERY

## 2025-03-21 PROCEDURE — 3700000001 HC GENERAL ANESTHESIA TIME - INITIAL BASE CHARGE: Performed by: SURGERY

## 2025-03-21 PROCEDURE — 7100000001 HC RECOVERY ROOM TIME - INITIAL BASE CHARGE: Performed by: SURGERY

## 2025-03-21 RX ORDER — ASPIRIN 81 MG/1
81 TABLET ORAL ONCE
COMMUNITY

## 2025-03-21 RX ORDER — SODIUM CHLORIDE 0.9 G/100ML
INJECTION, SOLUTION IRRIGATION AS NEEDED
Status: DISCONTINUED | OUTPATIENT
Start: 2025-03-21 | End: 2025-03-21 | Stop reason: HOSPADM

## 2025-03-21 RX ORDER — ONDANSETRON HYDROCHLORIDE 2 MG/ML
4 INJECTION, SOLUTION INTRAVENOUS ONCE AS NEEDED
Status: DISCONTINUED | OUTPATIENT
Start: 2025-03-21 | End: 2025-03-21 | Stop reason: HOSPADM

## 2025-03-21 RX ORDER — MIDAZOLAM HYDROCHLORIDE 1 MG/ML
INJECTION, SOLUTION INTRAMUSCULAR; INTRAVENOUS AS NEEDED
Status: DISCONTINUED | OUTPATIENT
Start: 2025-03-21 | End: 2025-03-21

## 2025-03-21 RX ORDER — SODIUM CHLORIDE, SODIUM LACTATE, POTASSIUM CHLORIDE, CALCIUM CHLORIDE 600; 310; 30; 20 MG/100ML; MG/100ML; MG/100ML; MG/100ML
100 INJECTION, SOLUTION INTRAVENOUS CONTINUOUS
Status: DISCONTINUED | OUTPATIENT
Start: 2025-03-21 | End: 2025-03-21 | Stop reason: HOSPADM

## 2025-03-21 RX ORDER — LIDOCAINE HYDROCHLORIDE 10 MG/ML
0.1 INJECTION, SOLUTION EPIDURAL; INFILTRATION; INTRACAUDAL; PERINEURAL ONCE
Status: DISCONTINUED | OUTPATIENT
Start: 2025-03-21 | End: 2025-03-21 | Stop reason: HOSPADM

## 2025-03-21 RX ORDER — FENTANYL CITRATE 50 UG/ML
50 INJECTION, SOLUTION INTRAMUSCULAR; INTRAVENOUS EVERY 5 MIN PRN
Status: DISCONTINUED | OUTPATIENT
Start: 2025-03-21 | End: 2025-03-21 | Stop reason: HOSPADM

## 2025-03-21 RX ORDER — OXYCODONE HYDROCHLORIDE 5 MG/1
10 TABLET ORAL EVERY 4 HOURS PRN
Status: DISCONTINUED | OUTPATIENT
Start: 2025-03-21 | End: 2025-03-21 | Stop reason: HOSPADM

## 2025-03-21 RX ORDER — ACETIC ACID 5 %
LIQUID (ML) MISCELLANEOUS CONTINUOUS PRN
Status: COMPLETED | OUTPATIENT
Start: 2025-03-21 | End: 2025-03-21

## 2025-03-21 RX ORDER — FENTANYL CITRATE 50 UG/ML
INJECTION, SOLUTION INTRAMUSCULAR; INTRAVENOUS AS NEEDED
Status: DISCONTINUED | OUTPATIENT
Start: 2025-03-21 | End: 2025-03-21

## 2025-03-21 RX ORDER — METOCLOPRAMIDE HYDROCHLORIDE 5 MG/ML
10 INJECTION INTRAMUSCULAR; INTRAVENOUS ONCE AS NEEDED
Status: DISCONTINUED | OUTPATIENT
Start: 2025-03-21 | End: 2025-03-21 | Stop reason: HOSPADM

## 2025-03-21 RX ORDER — ONDANSETRON HYDROCHLORIDE 2 MG/ML
INJECTION, SOLUTION INTRAVENOUS AS NEEDED
Status: DISCONTINUED | OUTPATIENT
Start: 2025-03-21 | End: 2025-03-21

## 2025-03-21 RX ORDER — OXYCODONE HYDROCHLORIDE 5 MG/1
5 TABLET ORAL EVERY 4 HOURS PRN
Status: DISCONTINUED | OUTPATIENT
Start: 2025-03-21 | End: 2025-03-21 | Stop reason: HOSPADM

## 2025-03-21 RX ORDER — LIDOCAINE HYDROCHLORIDE 20 MG/ML
INJECTION, SOLUTION INFILTRATION; PERINEURAL AS NEEDED
Status: DISCONTINUED | OUTPATIENT
Start: 2025-03-21 | End: 2025-03-21

## 2025-03-21 RX ORDER — FENTANYL CITRATE 50 UG/ML
25 INJECTION, SOLUTION INTRAMUSCULAR; INTRAVENOUS EVERY 5 MIN PRN
Status: DISCONTINUED | OUTPATIENT
Start: 2025-03-21 | End: 2025-03-21 | Stop reason: HOSPADM

## 2025-03-21 RX ORDER — ACETAMINOPHEN 325 MG/1
650 TABLET ORAL EVERY 4 HOURS PRN
Status: DISCONTINUED | OUTPATIENT
Start: 2025-03-21 | End: 2025-03-21 | Stop reason: HOSPADM

## 2025-03-21 RX ORDER — OXYCODONE HYDROCHLORIDE 5 MG/1
5 TABLET ORAL EVERY 6 HOURS PRN
Qty: 15 TABLET | Refills: 0 | Status: SHIPPED | OUTPATIENT
Start: 2025-03-21

## 2025-03-21 RX ORDER — IBUPROFEN 600 MG/1
600 TABLET ORAL EVERY 6 HOURS PRN
COMMUNITY

## 2025-03-21 RX ORDER — BUPIVACAINE HCL/EPINEPHRINE 0.5-1:200K
VIAL (ML) INJECTION AS NEEDED
Status: DISCONTINUED | OUTPATIENT
Start: 2025-03-21 | End: 2025-03-21 | Stop reason: HOSPADM

## 2025-03-21 RX ORDER — PROPOFOL 10 MG/ML
INJECTION, EMULSION INTRAVENOUS AS NEEDED
Status: DISCONTINUED | OUTPATIENT
Start: 2025-03-21 | End: 2025-03-21

## 2025-03-21 RX ADMIN — LIDOCAINE HYDROCHLORIDE 60 MG: 20 INJECTION, SOLUTION INFILTRATION; PERINEURAL at 14:01

## 2025-03-21 RX ADMIN — PROPOFOL 160 MG: 10 INJECTION, EMULSION INTRAVENOUS at 14:01

## 2025-03-21 RX ADMIN — DEXAMETHASONE SODIUM PHOSPHATE 4 MG: 4 INJECTION INTRA-ARTICULAR; INTRALESIONAL; INTRAMUSCULAR; INTRAVENOUS; SOFT TISSUE at 14:04

## 2025-03-21 RX ADMIN — MIDAZOLAM 2 MG: 1 INJECTION INTRAMUSCULAR; INTRAVENOUS at 13:57

## 2025-03-21 RX ADMIN — FENTANYL CITRATE 50 MCG: 50 INJECTION, SOLUTION INTRAMUSCULAR; INTRAVENOUS at 14:01

## 2025-03-21 RX ADMIN — SODIUM CHLORIDE, POTASSIUM CHLORIDE, SODIUM LACTATE AND CALCIUM CHLORIDE: 600; 310; 30; 20 INJECTION, SOLUTION INTRAVENOUS at 13:54

## 2025-03-21 RX ADMIN — ONDANSETRON 4 MG: 2 INJECTION INTRAMUSCULAR; INTRAVENOUS at 14:04

## 2025-03-21 RX ADMIN — ACETAMINOPHEN 650 MG: 325 TABLET ORAL at 15:15

## 2025-03-21 SDOH — HEALTH STABILITY: MENTAL HEALTH: CURRENT SMOKER: 1

## 2025-03-21 ASSESSMENT — COLUMBIA-SUICIDE SEVERITY RATING SCALE - C-SSRS
6. HAVE YOU EVER DONE ANYTHING, STARTED TO DO ANYTHING, OR PREPARED TO DO ANYTHING TO END YOUR LIFE?: NO
2. HAVE YOU ACTUALLY HAD ANY THOUGHTS OF KILLING YOURSELF?: NO
1. IN THE PAST MONTH, HAVE YOU WISHED YOU WERE DEAD OR WISHED YOU COULD GO TO SLEEP AND NOT WAKE UP?: NO

## 2025-03-21 ASSESSMENT — PAIN - FUNCTIONAL ASSESSMENT
PAIN_FUNCTIONAL_ASSESSMENT: 0-10

## 2025-03-21 ASSESSMENT — PAIN DESCRIPTION - DESCRIPTORS
DESCRIPTORS: ACHING
DESCRIPTORS: ACHING

## 2025-03-21 ASSESSMENT — PAIN SCALES - GENERAL
PAINLEVEL_OUTOF10: 7
PAINLEVEL_OUTOF10: 3
PAINLEVEL_OUTOF10: 0 - NO PAIN
PAINLEVEL_OUTOF10: 3

## 2025-03-21 NOTE — ANESTHESIA PREPROCEDURE EVALUATION
"Patient: Odilia Villegas \"Shay\"    Procedure Information       Anesthesia Start Date/Time: 03/21/25 1354    Procedure: High Resolution Anoscopy with Biopsy, Excision of condyloma - Plan for high resolution anoscopy with biopsy. Need smoke evacuator, lugols, acetic acid, regular sized q tips, local, lighted hill f[place]ons, anorectal tray. Thanks.    Location: [facility] OR 02 / [facility]    Surgeons: Shameka Still MD          Vitals:    03/21/25 1314   BP: 119/55   Pulse: 62   Resp: 16   Temp: 37 °C (98.6 °F)   SpO2: 99%       Past Surgical History:   Procedure Laterality Date   • OTHER SURGICAL HISTORY  04/26/2017    Anal Surgery (anoscopy)     Past Medical History:   Diagnosis Date   • Asthma    • Eczema    • HIV (human immunodeficiency virus infection)    • Sickle cell trait (CMS-[facility])    • Vitamin D deficiency      No current facility-administered medications for this encounter.    Facility-Administered Medications Ordered in Other Encounters:   •  midazolam (Versed) injection, , intravenous, PRN, Kimberli Livia, CAA, 2 mg at 03/21/25 1357  Prior to Admission medications    Medication Sig Start Date End Date Taking? Authorizing Provider   aspirin 81 mg EC tablet Take 1 tablet (81 mg) by mouth 1 time.   Yes Historical Provider, MD   Biktarvy -25 mg tablet Take 1 tablet by mouth once daily. 12/30/24  Yes Conner Montemayor MD   cholecalciferol (Vitamin D-3) 50 MCG (2000 UT) tablet Take 1 tablet (2,000 Units) by mouth once daily.   Yes Historical Provider, MD   ibuprofen 600 mg tablet Take 1 tablet (600 mg) by mouth every 6 hours if needed for mild pain (1 - 3).   Yes Historical Provider, MD   loratadine (Claritin) 10 mg tablet Take 1 tablet (10 mg) by mouth once daily.  Patient taking differently: Take 1 tablet (10 mg) by mouth once daily as needed for allergies. 10/10/24  Yes Conner Montemayor MD     Allergies   Allergen Reactions   • Iodine Anaphylaxis   • Shellfish Containing " "Products Anaphylaxis and Swelling   • Pollen Extracts Itching and Runny nose     Social History     Tobacco Use   • Smoking status: Every Day     Types: Cigars     Passive exposure: Past   • Smokeless tobacco: Never   • Tobacco comments:     Smokes \"3-4 cigars a day.\" Denies SOB with activity, stairs or lying flat. Ambulates freely. No illness last 30 days. Denies personal or family reaction to anesthesia.    Substance Use Topics   • Alcohol use: Yes     Comment: has \"1-3 drinks every week to 2 weeks.\"         Chemistry    Lab Results   Component Value Date/Time     02/13/2025 1130    K 4.0 02/13/2025 1130     02/13/2025 1130    CO2 24 02/13/2025 1130    BUN 17 02/13/2025 1130    CREATININE 1.08 02/13/2025 1130    Lab Results   Component Value Date/Time    CALCIUM 9.3 02/13/2025 1130    ALKPHOS 58 02/13/2025 1130    AST 24 02/13/2025 1130    ALT 17 02/13/2025 1130    BILITOT 0.5 02/13/2025 1130          Lab Results   Component Value Date/Time    WBC 8.3 02/13/2025 1130    HGB 14.0 02/13/2025 1130    HCT 40.2 (L) 02/13/2025 1130     (L) 02/13/2025 1130     No results found for: \"PROTIME\", \"PTT\", \"INR\"  No results found for this or any previous visit (from the past 4464 hours).  No results found for this or any previous visit from the past 1095 days.        Relevant Problems   ID   (+) HIV disease (Multi)       Clinical information reviewed:   Tobacco  Allergies  Meds   Med Hx  Surg Hx   Fam Hx  Soc Hx        NPO Detail:  NPO/Void Status  Carbohydrate Drink Given Prior to Surgery? : N  Date of Last Liquid: 03/21/25  Time of Last Liquid: 0500  Date of Last Solid: 03/20/25  Time of Last Solid: 2359  Last Intake Type: Clear fluids  Time of Last Void: 1305         Physical Exam    Airway  Mallampati: II  TM distance: >3 FB  Neck ROM: full     Cardiovascular   Rhythm: regular  Rate: normal     Dental        Pulmonary   Breath sounds clear to auscultation     Abdominal      Other findings: Poor " dentition.  Multiple broken and missing teeth,      Anesthesia Plan    History of general anesthesia?: yes  History of complications of general anesthesia?: no    ASA 3     general     The patient is a current smoker.  Patient smoked on day of procedure.    intravenous induction   Anesthetic plan and risks discussed with patient and mother.    Plan discussed with CAA.

## 2025-03-21 NOTE — ANESTHESIA POSTPROCEDURE EVALUATION
"Patient: Odilia Villegas \"Shay\"    Procedure Summary       Date: 03/21/25 Room / Location: [facility] [place] [facility] OR 02 / [facility] [place] [facility] OR    Anesthesia Start: 1354 Anesthesia Stop: 1421    Procedure: High Resolution Anoscopy with Biopsy, Excision of condyloma Diagnosis:       Anal dysplasia      (Anal dysplasia [K62.82])    Surgeons: Shameka Still MD Responsible Provider: Jessica Cordova MD    Anesthesia Type: general ASA Status: 3            Anesthesia Type: general    Vitals Value Taken Time   /58 03/21/25 1449   Temp 36 °C (96.8 °F) 03/21/25 1419   Pulse 81 03/21/25 1449   Resp 16 03/21/25 1449   SpO2 98 % 03/21/25 1449       Anesthesia Post Evaluation    Patient participation: complete - patient participated  Level of consciousness: awake and alert  Pain management: adequate  Airway patency: patent  Cardiovascular status: acceptable  Respiratory status: acceptable  Hydration status: acceptable  Postoperative Nausea and Vomiting: none    No notable events documented.    "

## 2025-03-21 NOTE — OP NOTE
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]     Procedures  High Resolution Anoscopy with Biopsy, Excision of condyloma  60189 - CO ANOSCOPY ABLATION LESION      Surgeons      * Shameka Still - Primary    Resident/Fellow/Other Assistant:  Surgeons and Role:  * No surgeons found with a matching role *    Staff:   Circulator: Junior Pruitt Person: Adi    Anesthesia Staff: Anesthesiologist: Jessica Cordova MD  C-AA: JUNE Cota    Procedure Summary  Anesthesia: General  ASA: III  Estimated Blood Loss: 0mL  Intra-op Medications:   Administrations occurring from 1333 to 1413 on 25:   Medication Name Total Dose   surgical lubricant gel 1 Application   BUPivacaine-EPINEPHrine (Marcaine w/EPI) 0.5 %-1:200,000 injection 30 mL   iodine-potassium iodide (Lugol's) topical solution 1 Application   sodium chloride 0.9 % irrigation solution 500 mL   acetic acid (bulk) external solution 30 mL   dexAMETHasone (Decadron) injection 4 mg/mL 4 mg   fentaNYL (Sublimaze) injection 50 mcg/mL 50 mcg   LR bolus Cannot be calculated   lidocaine (Xylocaine) injection 2 % 60 mg   midazolam (Versed) injection 1 mg/mL 2 mg   ondansetron (Zofran) 2 mg/mL injection 4 mg   propofol (Diprivan) injection 10 mg/mL 160 mg              Anesthesia Record               Intraprocedure I/O Totals          Intake    LR bolus 200.00 mL    Total Intake 200 mL          Specimen: No specimens collected              Drains and/or Catheters: * None in log *    Tourniquet Times:         Implants:     Findings: small flat dysplasia, fulgurated    Indications: Odilia Villegas \"Shay\" is an 35 y.o. 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     The " patient was seen in the preoperative area. The risks, benefits, complications, treatment options, non-operative alternatives, expected recovery and outcomes were discussed with the patient. The possibilities of reaction to medication, pulmonary aspiration, injury to surrounding structures, bleeding, recurrent infection, the need for additional procedures, failure to diagnose a condition, and creating a complication requiring transfusion or operation were discussed with the patient. The patient concurred with the proposed plan, giving informed consent.  The site of surgery was properly noted/marked if necessary per policy. The patient has been actively warmed in preoperative area. Preoperative antibiotics are not indicated. Venous thrombosis prophylaxis have been ordered including bilateral sequential compression devices    Procedure Details: Procedure:   Informed consent was obtained including discussion of risks, benefits, and alternatives. The patient was brought to the operating room and placed supine. Sequential compression devices were placed. Huddle was completed in accordance with hospital procedures. Anesthesia was induced and LMA was placed. The patient was placed in lithotomy with all pressure points padded. The area was prepped and draped in the usual fashion. Time out was completed. Digital rectal exam was performed. Pudendal nerve block was performed with 1/4% marcaine with epinephrine. Intersphincteric block  was also performed. A total of 30cc of local anesthesia was utilized.    Externally, the perineal exam was normal. [place] anoscope was introduced. The anal canal was examined sequentially. The canal itself was divided into octants and in each octant, the distal rectum, dentate, anal canal, and anal verge was examined. Magnification, acetic acid, and Lugol's were utilized to improve visualization. Areas of abnormality were destroyed. He had 4 small areas each measuring 2-3mm with two  in the left lateral, one anterior, and one right lateral.     The area was checked for hemostasis and found to be adequate. All visible lesions were addressed.     The patient was then returned to supine. Anesthesia was weaned. The patient was transferred to PACU awake and in stable conditions. Counts were reported correct at the end of the procedure. I was present and scrubbed throughout.    Complications:  None; patient tolerated the procedure well.    Disposition: PACU - hemodynamically stable.  Condition: stable                 Additional Details: none    Attending Attestation: I was present and scrubbed for the entire procedure.    Shameka Still  Phone Number: [phone]

## 2025-03-21 NOTE — H&P
"I have reviewed the patient's History and Physical Examination. I have personally seen and evaluated the patient, repeating key portions. There is no significant interval change.     Surgery is still indicated. Yes    Consent reviewed and signed by patient/family: Yes    Operative site verified and marked: N/A    Reviewed and approved by RAHEEM MIRZA on 3/21/25 at 1:23 PM.        History Of Present Illness        Subjective  Odilia Villegas \"Shay\" is here for follow up of anal dysplasia.  he  has not has not noticed any new lesions or changes.      HIV status: positive- CD4 WNL and VL UD  Smoking status: current smoker     Previous High Resolution Anoscopies and Cytologies have shown:      6/2015 PAP: Negative  6/2016 PAP: ASCUS  10/2016 HRA: Condyloma with moderate dysplasia  6/2023 PAP: LSIL  7/2023 HRA: squamous mucosa with mild superficial neutrophilic infiltrate       Past Medical History  He  Medical History        Past Medical History:   Diagnosis Date    HIV (human immunodeficiency virus infection)      Vitamin D deficiency              Surgical History  He  Surgical History         Past Surgical History:   Procedure Laterality Date    OTHER SURGICAL HISTORY   04/26/2017     Anal Surgery            Social History  He reports that he has been smoking cigars. He has never used smokeless tobacco. He reports current alcohol use. He reports current drug use. Drug: Marijuana.     Family History  Family History   No family history on file.        Allergies  Shellfish containing products and Pollen extracts     Home Medications          Prior to Admission medications    Medication Sig Start Date End Date Taking? Authorizing Provider   Biktarvy -25 mg tablet Take 1 tablet by mouth once daily. 12/28/23     Conner Montemayor MD   cholecalciferol (Vitamin D-3) 50 MCG (2000 UT) tablet Take 1 tablet (2,000 Units) by mouth once daily.       Historical Provider, MD   loratadine (Claritin) 10 mg tablet Take 1 " tablet (10 mg) by mouth once daily.       Historical Provider, MD         Review of Systems     Physical Exam     Perineal/SAVANA:  Perineum: normal  SAVANA: normal  Tone: normal     Anoscopy     Date/Time: 1/23/2025 9:49 AM     Performed by: Shameka Still MD  Authorized by: Shameka Still MD    Consent:     Consent obtained:  Verbal    Consent given by:  Patient    Risks, benefits, and alternatives were discussed: yes      Risks discussed:  Bleeding and pain    Alternatives discussed:  No treatment  Universal protocol:     Procedure explained and questions answered to patient or proxy's satisfaction: yes      Patient identity confirmed:  Verbally with patient  Indications:     Indications comment:  Anal dysplasia  Post-procedure details:     Procedure completion:  Tolerated well, no immediate complications        Last Recorded Vitals  There were no vitals taken for this visit.        Assessment and Plan  35 y.o. male here for follow up of anal dysplasia.      Normal exam. Follow up 6 months or sooner, depending on cytology.      Discussed smoking cessation and encouraged him to cut down to eventually quit. He is smoking somewhat less (3-5 per day)

## 2025-06-12 ENCOUNTER — TELEPHONE (OUTPATIENT)
Dept: IMMUNOLOGY | Facility: CLINIC | Age: 35
End: 2025-06-12

## 2025-06-12 NOTE — TELEPHONE ENCOUNTER
Dori calls Pt to check in after Pt NSH his appt with Dr. Montemayor this morning. Per RN Pt had requested to see this Sw when he confirmed his appointment earlier this week. Dori leaves  for Pt, will continue to follow.

## 2025-07-14 ENCOUNTER — TELEPHONE (OUTPATIENT)
Dept: IMMUNOLOGY | Facility: CLINIC | Age: 35
End: 2025-07-14

## 2025-07-14 NOTE — TELEPHONE ENCOUNTER
Pt left  requesting help as his insurance coverage is lapsed while he is currently not working. Pt out of meds for about a month. Sw returns Pt's call. Pt reports that his sister passed away and he is taking care of his mother who is ill. Pt had an issue at work - hoping to start again soon but is currently on a probation of sorts. Pt reports that he applied for Medicaid over the phone - will keep Sw updated. Sw requested that [employer] transition Pt to formulary - approved and Pt advised to get his meds from [facility], advise if he has any issues. Dori emails Pt to provide him with their email address and other contact information. Dori will ask RN to reschedule Pt with MD - last appt was a [facility]. Pt will continue to contact Dori if any issues or barriers come up. Dori will continue to follow PRN.

## 2025-07-15 DIAGNOSIS — Z21 HIV INFECTION, UNSPECIFIED SYMPTOM STATUS: ICD-10-CM

## 2025-07-15 RX ORDER — BICTEGRAVIR SODIUM, EMTRICITABINE, AND TENOFOVIR ALAFENAMIDE FUMARATE 50; 200; 25 MG/1; MG/1; MG/1
1 TABLET ORAL DAILY
Qty: 30 TABLET | Refills: 1 | Status: SHIPPED | OUTPATIENT
Start: 2025-07-15

## 2025-07-24 ENCOUNTER — APPOINTMENT (OUTPATIENT)
Dept: SURGERY | Facility: CLINIC | Age: 35
End: 2025-07-24
Payer: COMMERCIAL

## 2025-08-26 ENCOUNTER — TELEPHONE (OUTPATIENT)
Dept: IMMUNOLOGY | Facility: CLINIC | Age: 35
End: 2025-08-26

## 2025-08-28 ENCOUNTER — DOCUMENTATION (OUTPATIENT)
Dept: IMMUNOLOGY | Facility: CLINIC | Age: 35
End: 2025-08-28

## 2025-08-28 ENCOUNTER — OFFICE VISIT (OUTPATIENT)
Dept: IMMUNOLOGY | Facility: CLINIC | Age: 35
End: 2025-08-28

## 2025-08-28 VITALS
HEART RATE: 81 BPM | OXYGEN SATURATION: 100 % | BODY MASS INDEX: 20.2 KG/M2 | WEIGHT: 144.8 LBS | SYSTOLIC BLOOD PRESSURE: 105 MMHG | DIASTOLIC BLOOD PRESSURE: 72 MMHG

## 2025-08-28 DIAGNOSIS — B20 HIV DISEASE (MULTI): Primary | ICD-10-CM

## 2025-08-28 DIAGNOSIS — Z21 HIV INFECTION, UNSPECIFIED SYMPTOM STATUS: ICD-10-CM

## 2025-08-28 DIAGNOSIS — F32.9 REACTIVE DEPRESSION: ICD-10-CM

## 2025-08-28 DIAGNOSIS — E55.9 VITAMIN D DEFICIENCY: ICD-10-CM

## 2025-08-28 DIAGNOSIS — R85.610 PAP SMEAR OF ANUS WITH ASCUS: ICD-10-CM

## 2025-08-28 LAB
ALBUMIN SERPL BCP-MCNC: 4.7 G/DL (ref 3.4–5)
ALP SERPL-CCNC: 55 U/L (ref 33–120)
ALT SERPL W P-5'-P-CCNC: 17 U/L (ref 10–52)
ANION GAP SERPL CALC-SCNC: 13 MMOL/L (ref 10–20)
AST SERPL W P-5'-P-CCNC: 21 U/L (ref 9–39)
BASOPHILS # BLD AUTO: 0.03 X10*3/UL (ref 0–0.1)
BASOPHILS NFR BLD AUTO: 0.4 %
BILIRUB SERPL-MCNC: 0.6 MG/DL (ref 0–1.2)
BUN SERPL-MCNC: 15 MG/DL (ref 6–23)
C TRACH RRNA SPEC QL NAA+PROBE: NEGATIVE
CALCIUM SERPL-MCNC: 10 MG/DL (ref 8.6–10.6)
CHLORIDE SERPL-SCNC: 101 MMOL/L (ref 98–107)
CO2 SERPL-SCNC: 29 MMOL/L (ref 21–32)
CREAT SERPL-MCNC: 1.12 MG/DL (ref 0.5–1.3)
EGFRCR SERPLBLD CKD-EPI 2021: 89 ML/MIN/1.73M*2
EOSINOPHIL # BLD AUTO: 0.15 X10*3/UL (ref 0–0.7)
EOSINOPHIL NFR BLD AUTO: 2 %
ERYTHROCYTE [DISTWIDTH] IN BLOOD BY AUTOMATED COUNT: 13 % (ref 11.5–14.5)
GLUCOSE SERPL-MCNC: 79 MG/DL (ref 74–99)
HCT VFR BLD AUTO: 42.3 % (ref 41–52)
HGB BLD-MCNC: 14.7 G/DL (ref 13.5–17.5)
IMM GRANULOCYTES # BLD AUTO: 0.03 X10*3/UL (ref 0–0.7)
IMM GRANULOCYTES NFR BLD AUTO: 0.4 % (ref 0–0.9)
LYMPHOCYTES # BLD AUTO: 2.67 X10*3/UL (ref 1.2–4.8)
LYMPHOCYTES NFR BLD AUTO: 35.7 %
MCH RBC QN AUTO: 32.3 PG (ref 26–34)
MCHC RBC AUTO-ENTMCNC: 34.8 G/DL (ref 32–36)
MCV RBC AUTO: 93 FL (ref 80–100)
MONOCYTES # BLD AUTO: 0.68 X10*3/UL (ref 0.1–1)
MONOCYTES NFR BLD AUTO: 9.1 %
N GONORRHOEA DNA SPEC QL PROBE+SIG AMP: NEGATIVE
NEUTROPHILS # BLD AUTO: 3.91 X10*3/UL (ref 1.2–7.7)
NEUTROPHILS NFR BLD AUTO: 52.4 %
NRBC BLD-RTO: 0 /100 WBCS (ref 0–0)
PLATELET # BLD AUTO: 168 X10*3/UL (ref 150–450)
POTASSIUM SERPL-SCNC: 4.5 MMOL/L (ref 3.5–5.3)
PROT SERPL-MCNC: 7.8 G/DL (ref 6.4–8.2)
RBC # BLD AUTO: 4.55 X10*6/UL (ref 4.5–5.9)
SODIUM SERPL-SCNC: 138 MMOL/L (ref 136–145)
TREPONEMA PALLIDUM IGG+IGM AB [PRESENCE] IN SERUM OR PLASMA BY IMMUNOASSAY: NONREACTIVE
WBC # BLD AUTO: 7.5 X10*3/UL (ref 4.4–11.3)

## 2025-08-28 PROCEDURE — 99215 OFFICE O/P EST HI 40 MIN: CPT | Performed by: INTERNAL MEDICINE

## 2025-08-28 PROCEDURE — 36415 COLL VENOUS BLD VENIPUNCTURE: CPT | Performed by: INTERNAL MEDICINE

## 2025-08-28 PROCEDURE — 85025 COMPLETE CBC W/AUTO DIFF WBC: CPT | Performed by: INTERNAL MEDICINE

## 2025-08-28 PROCEDURE — 86780 TREPONEMA PALLIDUM: CPT | Performed by: INTERNAL MEDICINE

## 2025-08-28 PROCEDURE — 88185 FLOWCYTOMETRY/TC ADD-ON: CPT | Performed by: INTERNAL MEDICINE

## 2025-08-28 PROCEDURE — 87491 CHLMYD TRACH DNA AMP PROBE: CPT | Performed by: INTERNAL MEDICINE

## 2025-08-28 PROCEDURE — 80053 COMPREHEN METABOLIC PANEL: CPT | Performed by: INTERNAL MEDICINE

## 2025-08-28 PROCEDURE — 87536 HIV-1 QUANT&REVRSE TRNSCRPJ: CPT | Performed by: INTERNAL MEDICINE

## 2025-08-28 RX ORDER — BICTEGRAVIR SODIUM, EMTRICITABINE, AND TENOFOVIR ALAFENAMIDE FUMARATE 50; 200; 25 MG/1; MG/1; MG/1
1 TABLET ORAL DAILY
Qty: 30 TABLET | Refills: 3 | Status: SHIPPED | OUTPATIENT
Start: 2025-08-28

## 2025-08-28 ASSESSMENT — ENCOUNTER SYMPTOMS
ENDOCRINE NEGATIVE: 1
FATIGUE: 0
HALLUCINATIONS: 0
SINUS PRESSURE: 0
RECTAL PAIN: 0
ARTHRALGIAS: 0
SORE THROAT: 0
CONSTIPATION: 0
GASTROINTESTINAL NEGATIVE: 1
EYE REDNESS: 0
BLOOD IN STOOL: 0
SLEEP DISTURBANCE: 0
FLANK PAIN: 0
EYE PAIN: 0
VOMITING: 0
SINUS PAIN: 0
ANAL BLEEDING: 0
NECK PAIN: 0
NECK STIFFNESS: 0
RHINORRHEA: 0
BRUISES/BLEEDS EASILY: 0
JOINT SWELLING: 0
SEIZURES: 0
COLOR CHANGE: 0
MYALGIAS: 0
HEADACHES: 0
APPETITE CHANGE: 0
NUMBNESS: 0
ABDOMINAL DISTENTION: 0
TREMORS: 0
DYSURIA: 0
CONFUSION: 0
DIZZINESS: 0
NERVOUS/ANXIOUS: 0
ALLERGIC/IMMUNOLOGIC NEGATIVE: 1
CHILLS: 0
COUGH: 0
NEUROLOGICAL NEGATIVE: 1
WOUND: 0
MUSCULOSKELETAL NEGATIVE: 1
PSYCHIATRIC NEGATIVE: 1
CONSTITUTIONAL NEGATIVE: 1
DECREASED CONCENTRATION: 0
HEMATURIA: 0
SPEECH DIFFICULTY: 0
HYPERACTIVE: 0
HEMATOLOGIC/LYMPHATIC NEGATIVE: 1
WHEEZING: 0
EYES NEGATIVE: 1
FREQUENCY: 0
PHOTOPHOBIA: 0
DYSPHORIC MOOD: 0
EYE DISCHARGE: 0
SHORTNESS OF BREATH: 0
LIGHT-HEADEDNESS: 0
DIFFICULTY URINATING: 0
ADENOPATHY: 0
BACK PAIN: 0
ABDOMINAL PAIN: 0
DIAPHORESIS: 0
EYE ITCHING: 0
NAUSEA: 0
CARDIOVASCULAR NEGATIVE: 1
DIARRHEA: 0
FEVER: 0

## 2025-08-28 ASSESSMENT — PAIN SCALES - GENERAL: PAINLEVEL_OUTOF10: 0-NO PAIN

## 2025-08-29 LAB
CD3+CD4+ CELLS # BLD: 1.23 X10E9/L (ref 0.35–2.74)
CD3+CD4+ CELLS # BLD: 1228 /MM3 (ref 350–2740)
CD3+CD4+ CELLS NFR BLD: 46 % (ref 29–57)
CD3+CD4+ CELLS/CD3+CD8+ CLL BLD: 1.59 % (ref 1–3.5)
CD3+CD8+ CELLS # BLD: 0.77 X10E9/L (ref 0.08–1.49)
CD3+CD8+ CELLS NFR BLD: 29 % (ref 7–31)
FLOW CYTOMETRY SPECIALIST REVIEW: NORMAL
HIV1 RNA # PLAS NAA DL=20: NOT DETECTED {COPIES}/ML
HIV1 RNA SPEC NAA+PROBE-LOG#: NORMAL {LOG_COPIES}/ML
LYMPHOCYTES # SPEC AUTO: 2.67 X10*3/UL

## 2025-12-18 ENCOUNTER — APPOINTMENT (OUTPATIENT)
Dept: IMMUNOLOGY | Facility: CLINIC | Age: 35
End: 2025-12-18

## (undated) DEVICE — Device

## (undated) DEVICE — GLOVE, SURGICAL, PROTEXIS PI , 6.5, PF, LF

## (undated) DEVICE — BRIEF, CURITY, XLARGE, MESH

## (undated) DEVICE — SYRINGE, 10 CC, LUER LOCK

## (undated) DEVICE — DRESSING, ABDOMINAL, TENDERSORB, 8 X 7-1/2 IN, STERILE

## (undated) DEVICE — SUTURE, VICRYL, 2-0, 27 IN, UR-6, VIOLET

## (undated) DEVICE — CONTAINER, SPECIMEN, 4 OZ, OR PEEL PACK, STERILE

## (undated) DEVICE — PREP TRAY, SKIN, DRY, W/GLOVES

## (undated) DEVICE — COUNTER, NEEDLE, FOAM BLOCK, W/MAGNET, W/BLADE GUARD, 10 COUNT, RED, LF

## (undated) DEVICE — GLOVE, SURGICAL, SENSICARE, SLT, SZ-7.0, PF, LF

## (undated) DEVICE — APPLICATOR, COTTON TIP, 6 IN, 2PK, STERILE

## (undated) DEVICE — LOOP, VESSEL, MAXI, RED

## (undated) DEVICE — PAD, GROUNDING, ELECTROSURGICAL, W/9 FT CABLE, POLYHESIVE II, ADULT, LF